# Patient Record
Sex: FEMALE | Race: WHITE | NOT HISPANIC OR LATINO | Employment: OTHER | URBAN - METROPOLITAN AREA
[De-identification: names, ages, dates, MRNs, and addresses within clinical notes are randomized per-mention and may not be internally consistent; named-entity substitution may affect disease eponyms.]

---

## 2017-04-18 ENCOUNTER — ALLSCRIPTS OFFICE VISIT (OUTPATIENT)
Dept: OTHER | Facility: OTHER | Age: 22
End: 2017-04-18

## 2018-01-11 NOTE — PSYCH
Psych Med Mgmt    Appearance: was calm and cooperative  Observed mood: less anxious  Observed mood: affect appropriate  Speech: a normal rate  Thought processes: normal thought processes  Hallucinations: no hallucinations present  Thought Content: no delusions  Abnormal Thoughts: The patient has no suicidal thoughts  Orientation: The patient is oriented to person, place and time, oriented to person, oriented to place and oriented to time  Recent and Remote Memory: short term memory intact and long term memory intact  Judgment: concentration fair  Insight and judgment improving   Muscle Strength And Tone  Muscle strength and tone were normal  Normal gait and station  Language: no difficulty naming common objects, no difficulty repeating a phrase and no difficulty writing a sentence  Fund of knowledge: Patient displays  above average  The patient is experiencing no localized pain  On a scale of 0 - 10 the pain severity is a 0  Goals addressed in session: Medication management  Supportive therapy     Treatment Recommendations: I met with Lissett Cook by myself  Genesis Harkins stated she graduated from [employer]  Also her boyfriend graduated and is looking for a job  Temporarily they're staying with her parents and is very stressful for her  She also talked about her plans for the future, she wants to take some classes on line and 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 with disability the way she has been helped  She finds that Cymbalta 30 mg has been helpful and would like to stay at that dose  She also finds that Lamictal 100 mg one tablet twice a day helps her and she tries to take the least amount of Alprazolam that she can  For now she finds that her medications are working well, she knows that she has stressful moments ahead and is trying to prepare   for them    She agrees with plan of care and will follow-up in 90 days or less if needed  The patient has been filling controlled prescriptions on time as prescribed to [facility] program     She reports decreased appetite, normal energy level, recent one lbs weight loss and normal number of sleep hours  Vitals  Signs   Recorded: 16RWC4934 01:01AM   Heart Rate: 80  Systolic: 531  Diastolic: 70  Height: 5 ft 5 in  Weight: 127 lb   BMI Calculated: 21 13  BSA Calculated: 1 63    Assessment    1  ADHD (attention deficit hyperactivity disorder), combined type (314 01) (F90 2)   2  Anxiety disorder (300 00) (F41 9)   3  Asperger's disorder (299 80) (F84 5)   4  Panic disorder (300 01) (F41 0)    Plan    1  DULoxetine HCl - 30 MG Oral Capsule Delayed Release Particles (Cymbalta); Take one capsule daily   2  ALPRAZolam 0 25 MG Oral Tablet; May take up to three tablets daily if needed for   anxiety    3  LamoTRIgine 100 MG Oral Tablet; TAKE 1 TABLET TWICE DAILY    Review of Systems    Constitutional: recent one lb weight loss, but No fever, no chills, feels well, no tiredness, no recent weight gain or loss  Cardiovascular: no complaints of slow or fast heart rate, no chest pain, no palpitations  Respiratory: no complaints of shortness of breath, no wheezing, no dyspnea on exertion  Gastrointestinal: no complaints of abdominal pain, no constipation, no nausea, no diarrhea, no vomiting  Genitourinary: no complaints of dysuria, no incontinence, no pelvic pain, no urinary frequency  Musculoskeletal: no complaints of arthralgia, no myalgias, no limb pain, no joint stiffness  Integumentary: no complaints of skin rash, no itching, no dry skin  Neurological: no complaints of headache, no confusion, no numbness, no dizziness  Active Problems    1  ADHD (attention deficit hyperactivity disorder), combined type (314 01) (F90 2)   2  Anxiety disorder (300 00) (F41 9)   3  Asperger's disorder (299 80) (F84 5)    Past Medical History    1   History of Oppositional defiant disorder (313 81) (F91 3)    The active problems and past medical history were reviewed and updated today  Allergies    1  Childrens Ibuprofen SUSP    Current Meds   1  ALPRAZolam 0 25 MG Oral Tablet; May take up to three tablets daily if needed for anxiety; Therapy: 02WPM8043 to (Evaluate:20Jan2017)  Requested for: 94Vyp0341; Last   Rx:52Qet9397 Ordered   2  Duac CS 1-5 % KIT; KIT EX X 20; Therapy: 05FCD2862 to (Last Rx:25Mar2011)  Requested for: 40IWJ1955 Ordered   3  Fluticasone Propionate 50 MCG/ACT Nasal Suspension; Therapy: 19CEV5048 to Recorded   4  LamoTRIgine 100 MG Oral Tablet; TAKE 1 TABLET TWICE DAILY; Therapy: 22YLN7617 to (Evaluate:20Jan2017)  Requested for: 69Nda9762; Last   Rx:39Pzv1508 Ordered   5  Norgestimate-Eth Estradiol 0 25-35 MG-MCG Oral Tablet; Therapy: 49GUB2242 to Recorded   6  Ventolin  (90 Base) MCG/ACT Inhalation Aerosol Solution; Therapy: 68HQT9348 to Recorded    The medication list was reviewed and updated today  Family Psych History    The family history was reviewed and updated today  Social History    · College student   · Household: Younger brother   · Never a smoker  The social history was reviewed and updated today  The social history was reviewed and is unchanged  She graduated from [employer]  End of Encounter Meds    1  ALPRAZolam 0 25 MG Oral Tablet; May take up to three tablets daily if needed for anxiety; Therapy: 81ZMQ8759 to (Evaluate:19May2017)  Requested for: 66Ucv8801; Last   Rx:20Dec2016 Ordered   2  DULoxetine HCl - 30 MG Oral Capsule Delayed Release Particles (Cymbalta); Take one   capsule daily; Therapy: 95Jsa8770 to (Evaluate:19May2017)  Requested for: 59Piv5390; Last   Rx:06Rpu0098 Ordered    3  LamoTRIgine 100 MG Oral Tablet; TAKE 1 TABLET TWICE DAILY; Therapy: 60NKU0009 to (Evaluate:19May2017)  Requested for: 71Uwn2459; Last   Rx:14Lwv3057 Ordered    4   Duac CS 1-5 % KIT; KIT EX X 20; Therapy: 59XNP6845 to (Last Rx:25Mar2011)  Requested for: 40YBA1630 Ordered   5  Fluticasone Propionate 50 MCG/ACT Nasal Suspension; Therapy: 24OOL0316 to Recorded   6  Norgestimate-Eth Estradiol 0 25-35 MG-MCG Oral Tablet; Therapy: 64TEG7619 to Recorded   7  Ventolin  (90 Base) MCG/ACT Inhalation Aerosol Solution;    Therapy: 89VQX4769 to Recorded    Future Appointments    Date/Time Provider Specialty Site   04/18/2017 10:00 AM Mendoza Mcdonald MD Psychiatry [facility] 81     Signatures   Electronically signed by : Donald Webber MD; Jan 5 2017  1:04AM EST                       (Author)

## 2018-01-13 NOTE — PSYCH
Psych Med Mgmt    Appearance: was calm and cooperative  Observed mood: anxious  Observed mood: affect appropriate  Speech: a normal rate  Thought processes: normal thought processes  Hallucinations: no hallucinations present  Thought Content: no delusions  Abnormal Thoughts: The patient has no suicidal thoughts  Orientation: The patient is oriented to person, place and time, oriented to person, oriented to place and oriented to time  Recent and Remote Memory: short term memory intact and long term memory intact  Judgment: Concentration good insight and judgment improving   Muscle Strength And Tone  Muscle strength and tone were normal  Normal gait and station  Language: no difficulty naming common objects, no difficulty repeating a phrase and no difficulty writing a sentence  Fund of knowledge: Patient displays  Above-average  The patient is experiencing no localized pain  On a scale of 0 - 10 the pain severity is a 0  Goals addressed in session: Medication management  Supportive therapy     Treatment Recommendations: I met with Mark Mills by myself  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, 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[employer], 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  Markекатерина Mills stated she continues to struggle with her inability to leave the house especially going grocery shopping and other things that   she needs to get out of the house for  She has looked into service dog and brought papers for me to fill out in order to receive   grants that could pay for it the service and training of the dog, to assist someone with autism spectrum disorder  She struggles with getting out of the house  She struggles interacting with people she doesn't know, she struggles with loud noises others touching her, bright lights  She struggles misreading the cues of the environment and not being sure of interactions with others    She wants to continue to help others and she realizes in order to do that she's got to overcome some of her limitations  As long as she is in her home, her anxiety is manageable and she can do a lot from home but she knows is not enough  In her relationship with her boyfriend because he is not driving right now and he depends on her, she pushes herself not to let him   down  Willy Mike tends to isolate to decrease her feelings of anxiety and panic attacks  She denied feeling depressed, depression comes when she sees her limitations and she faces those when she tries to get out of   the house  For now she thinks that her medications are helpful and will continue the same  Continue with Cymbalta 30 mg daily  Lamictal 100 mg twice a day and alprazolam 0 25 once a day if needed for anxiety  Willy Mike realizes that in spite of his limitation she also has made a lot of progress  Will continue with present treatment plan  Willy Mike agreed to plan of care  We'll follow-up in 60-90 days or less if needed  She reports normal appetite, normal energy level, no weight change and normal number of sleep hours  Vitals  Signs   Recorded: 18Apr2017 10:23AM   Heart Rate: 80  Systolic: 105  Diastolic: 90  Height: 5 ft 5 in  Weight: 128 lb   BMI Calculated: 21 3  BSA Calculated: 1 64    Assessment    1  ADHD (attention deficit hyperactivity disorder), combined type (314 01) (F90 2)   2  Anxiety disorder (300 00) (F41 9)   3  Asperger's disorder (299 80) (F84 5)   4  Panic disorder (300 01) (F41 0)    Plan    1  From  ALPRAZolam 0 25 MG Oral Tablet May take up to two tablets per day for   severe anxiety To ALPRAZolam 0 25 MG Oral Tablet Take one tablet if needed daily for   anxiety   2  DULoxetine HCl - 30 MG Oral Capsule Delayed Release Particles (Cymbalta); Take one capsule daily    3  LamoTRIgine 100 MG Oral Tablet; TAKE 1 TABLET TWICE DAILY    Review of Systems    Constitutional: No fever, no chills, feels well, no tiredness, no recent weight gain or loss     Cardiovascular: no complaints of slow or fast heart rate, no chest pain, no palpitations  Respiratory: no complaints of shortness of breath, no wheezing, no dyspnea on exertion  Gastrointestinal: no complaints of abdominal pain, no constipation, no nausea, no diarrhea, no vomiting  Genitourinary: no complaints of dysuria, no incontinence, no pelvic pain, no urinary frequency  Musculoskeletal: no complaints of arthralgia, no myalgias, no limb pain, no joint stiffness  Integumentary: no complaints of skin rash, no itching, no dry skin  Neurological: no complaints of headache, no confusion, no numbness, no dizziness  Active Problems    1  ADHD (attention deficit hyperactivity disorder), combined type (314 01) (F90 2)   2  Anxiety disorder (300 00) (F41 9)   3  Asperger's disorder (299 80) (F84 5)   4  Panic disorder (300 01) (F41 0)    Past Medical History    1  History of Oppositional defiant disorder (313 81) (F91 3)    Allergies    1  Childrens Ibuprofen SUSP    Current Meds   1  ALPRAZolam 0 25 MG Oral Tablet; May take up to two tablets per day for severe anxiety; Therapy: 60LHD9708 to (Evaluate:07Apr2017)  Requested for: 67LMI7655; Last   Rx:08Mar2017 Ordered   2  Duac CS 1-5 % KIT; KIT EX X 20; Therapy: 23QVR8539 to (Last Rx:25Mar2011)  Requested for: 76BCP1052 Ordered   3  DULoxetine HCl - 30 MG Oral Capsule Delayed Release Particles; Take one capsule   daily; Therapy: 33Lda0358 to (Evaluate:19May2017)  Requested for: 45Ysm9131; Last   Rx:03Uke8653 Ordered   4  Fluticasone Propionate 50 MCG/ACT Nasal Suspension; Therapy: 15TJZ4794 to Recorded   5  LamoTRIgine 100 MG Oral Tablet; TAKE 1 TABLET TWICE DAILY; Therapy: 74NLH2287 to (Evaluate:72Icx7498)  Requested for: 07Hvv8203; Last   Rx:72Lnu0578 Ordered   6  Norgestimate-Eth Estradiol 0 25-35 MG-MCG Oral Tablet; Therapy: 20EGT7792 to Recorded   7  Ventolin  (90 Base) MCG/ACT Inhalation Aerosol Solution;    Therapy: 68GZL9798 to Recorded    The medication list was reviewed and updated today  Family Psych History    The family history was reviewed and updated today  Social History    · College student   · Household: Younger brother   · Never a smoker  The social history was reviewed and updated today  The social history was reviewed and is unchanged  She graduated from [employer]  She is now working for the [employer]  End of Encounter Meds    1  ALPRAZolam 0 25 MG Oral Tablet; Take one tablet if needed daily for anxiety; Therapy: 25XUJ3224 to (Evaluate:99Vgi3180)  Requested for: 24Pgy3214; Last   Rx:18Apr2017 Ordered   2  DULoxetine HCl - 30 MG Oral Capsule Delayed Release Particles (Cymbalta); Take one   capsule daily; Therapy: 62Qaz2703 to (Evaluate:48Xzs9966)  Requested for: 41Fus8429; Last   Rx:18Apr2017 Ordered    3  LamoTRIgine 100 MG Oral Tablet; TAKE 1 TABLET TWICE DAILY; Therapy: 13RRR5143 to (Evaluate:33Wti9378)  Requested for: 18Apr2017; Last   Rx:18Apr2017 Ordered    4  Duac CS 1-5 % KIT; KIT EX X 20; Therapy: 44LUV9946 to (Last Rx:25Mar2011)  Requested for: 52VUC4108 Ordered   5  Fluticasone Propionate 50 MCG/ACT Nasal Suspension; Therapy: 19OEB6605 to Recorded   6  Norgestimate-Eth Estradiol 0 25-35 MG-MCG Oral Tablet; Therapy: 65ISZ8891 to Recorded   7  Ventolin  (90 Base) MCG/ACT Inhalation Aerosol Solution;    Therapy: 43YDC9806 to Recorded    Future Appointments    Date/Time Provider Specialty Site   09/06/2017 08:30 AM Josué Boston MD Psychiatry [facility] 81     Signatures   Electronically signed by : Ester Diaz MD; Apr 22 2017  9:12PM EST                       (Author)

## 2018-01-16 NOTE — PSYCH
Psych Med Mgmt    Appearance: was calm and cooperative  Observed mood: anxious  Observed mood: affect appropriate  Speech: a normal rate  Thought processes: normal thought processes  Hallucinations: no hallucinations present  Thought Content: no delusions  Abnormal Thoughts: The patient has no suicidal thoughts  Orientation: The patient is oriented to person, place and time, oriented to person, oriented to place and oriented to time  Recent and Remote Memory: short term memory intact  Attention Span And Concentration: concentration intact  Judgment: insight and judgment fair   Muscle Strength And Tone  Muscle strength and tone were normal  Normal gait and station  Language: no difficulty naming common objects, no difficulty repeating a phrase and no difficulty writing a sentence  Fund of knowledge: Patient displays  at grade level  The patient is experiencing no localized pain  On a scale of 0 - 10 the pain severity is a 0  Treatment Recommendations: I met with Melinda by myself  She stated the last semester was very difficult because her anxiety was completely out of control  She missed a lot of classes, and some of her professors were not as helpful worsening her anxiety  She discussed how she has had to deal with relationship with parents, relationship with partner and his family  Last time we tried Zoloft, she felt like she was in a fog  Her father had tried Zoloft and had the same experience she did  He tried later Paxil and it was more helpful to him  She would like to try Paxil  We discussed benefits and risks of Paxil  Will start with 10 mg 1/2 tablet in the morning and 1/2 tablet in the evening  PT agreed to plan of care  Will follow up in four weeks or before if needed  Vitals  Signs [Data Includes: Current Encounter]   Recorded: [id number] 12:15PM   Height: 5 ft 5 in  Weight: 148 lb   BMI Calculated: 24 63  BSA Calculated: 1 74    Assessment    1  Asperger's disorder (299 80) (F84 5)   2  Anxiety disorder (300 00) (F41 9)   3  ADHD (attention deficit hyperactivity disorder), combined type (314 01) (F90 2)    Plan    1  Strattera 60 MG Oral Capsule   2  (1) CBC/PLT/DIFF; Status:Active; Requested SGL:83YWS8630;    3  (1) COMPREHENSIVE METABOLIC PANEL; Status:Active; Requested SON:53NZP7758;    4  (1) T4, FREE; Status:Active; Requested WAZ:23HUA6114;    5  (1) TSH; Status:Active; Requested EJ38OZM8843;    6  (1) VITAMIN D 25-HYDROXY; Status:Active; Requested RHO:54TMT2696;     7  PARoxetine HCl - 10 MG Oral Tablet (Paxil); Take 1/2 table for seven days then   1/2 bid    Review of Systems    Constitutional: No fever, no chills, feels well, no tiredness, no recent weight gain or loss  Cardiovascular: no complaints of slow or fast heart rate, no chest pain, no palpitations  Respiratory: no complaints of shortness of breath, no wheezing, no dyspnea on exertion  Gastrointestinal: nausea and vomiting  Genitourinary: no complaints of dysuria, no incontinence, no pelvic pain, no urinary frequency  Musculoskeletal: no complaints of arthralgia, no myalgias, no limb pain, no joint stiffness  Integumentary: no complaints of skin rash, no itching, no dry skin  Neurological: no complaints of headache, no confusion, no numbness, no dizziness  Active Problems    1  ADHD (attention deficit hyperactivity disorder), combined type (314 01) (F90 2)   2  Anxiety disorder (300 00) (F41 9)   3  Asperger's disorder (299 80) (F84 5)    Past Medical History    1  History of Oppositional defiant disorder (313 81) (F91 3)    The active problems and past medical history were reviewed and updated today  Allergies    1  Childrens Ibuprofen SUSP    Current Meds   1  ALPRAZolam 0 25 MG Oral Tablet; May take up to three tablets daily if needed for anxiety; Therapy: 20NRA8392 to (Evaluate:2016)  Requested for: 39Myz9864; Last   Rx:09Ieb8790 Ordered   2   Duac CS 1-5 % KIT; KIT EX X 20; Therapy: 46JNM6378 to (Last Rx:25Mar2011)  Requested for: 69PRD4847 Ordered   3  Fluticasone Propionate 50 MCG/ACT Nasal Suspension; Therapy: 52ZQQ7493 to Recorded   4  LamoTRIgine 100 MG Oral Tablet; TAKE 1 TABLET TWICE DAILY; Therapy: 24ZAL9209 to (Evaluate:18Jul2016)  Requested for: 00Zcx9384; Last   Rx:73Pbd8727 Ordered   5  Norgestimate-Eth Estradiol 0 25-35 MG-MCG Oral Tablet; Therapy: 69EYI5982 to Recorded   6  Strattera 60 MG Oral Capsule; TAKE 1 CAPSULE DAILY; Therapy: 71KXK2354 to (Evaluate:22Oiu3079)  Requested for: 24Gzy2605; Last   Rx:13Vhx6038 Ordered   7  Ventolin  (90 Base) MCG/ACT Inhalation Aerosol Solution; Therapy: 16KTD9527 to Recorded    The medication list was reviewed and updated today  Family Psych History    The family history was reviewed and updated today  Social History    · College student   · Household: Younger brother   · Never a smoker  The social history was reviewed and updated today  The social history was reviewed and is unchanged  She is attending [employer]  End of Encounter Meds    1  ALPRAZolam 0 25 MG Oral Tablet; May take up to three tablets daily if needed for anxiety; Therapy: 43KFY4834 to (Evaluate:18Jun2016)  Requested for: 91Bel4253; Last   Rx:01Ciu8652 Ordered   2  PARoxetine HCl - 10 MG Oral Tablet (Paxil); Take 1/2 table for seven days then 1/2 bid; Therapy: 82KHP0158 to (Evaluate:54Zmx7936)  Requested for: 72TLE9027; Last   Rx:08Jun2016 Ordered    3  LamoTRIgine 100 MG Oral Tablet; TAKE 1 TABLET TWICE DAILY; Therapy: 14OXS5538 to (Evaluate:33Dwy5537)  Requested for: 84Umj7925; Last   Rx:70Yhs3223 Ordered    4  Duac CS 1-5 % KIT; KIT EX X 20; Therapy: 83EPP4450 to (Last Rx:25Mar2011)  Requested for: 03KTP6487 Ordered   5  Fluticasone Propionate 50 MCG/ACT Nasal Suspension; Therapy: 36NCG5980 to Recorded   6  Norgestimate-Eth Estradiol 0 25-35 MG-MCG Oral Tablet;    Therapy: 07ZWJ8080 to Recorded   7  Ventolin  (90 Base) MCG/ACT Inhalation Aerosol Solution;    Therapy: 51TCF9804 to Recorded    Future Appointments    Date/Time Provider Specialty Site   08/23/2016 03:00 PM Gaurang David MD Psychiatry [facility] 81     Signatures   Electronically signed by : Dominique Loyd MD; Jun 11 2016 10:32PM EST                       (Author)

## 2018-01-17 NOTE — MISCELLANEOUS
Message  I spoke with Jaquelin Galvez, she has had a rash for over a month  First she thought it was poison ivy, then sometimes she gets hives from anxiety, but  her rash in her stomach is getting worse, and she thought it was the Paxil  She stopped it, it she believes it may be better  She is concerned with the amount of anxiety she has, she is taking a class, and she is also   teaching a class, and she has to get through  For now we discussed to stop Paxil  Will try Vistaril 10 mg twice per day if needed for anxiety  It could help decrease anxiety and also  help with rash  PT will call me Monday, and we will decide if we need to consider another medication  PT agreed with plan of care        Plan  Anxiety disorder    · HydrOXYzine HCl - 10 MG Oral Tablet; take one to two daily prn for anxiety    Signatures   Electronically signed by : Jb Goodman MD; Jul 8 2016  1:43PM EST                       (Author)

## 2018-01-22 VITALS
BODY MASS INDEX: 21.33 KG/M2 | HEART RATE: 80 BPM | SYSTOLIC BLOOD PRESSURE: 121 MMHG | DIASTOLIC BLOOD PRESSURE: 90 MMHG | HEIGHT: 65 IN | WEIGHT: 128 LBS

## 2019-06-27 ENCOUNTER — HOSPITAL ENCOUNTER (EMERGENCY)
Facility: HOSPITAL | Age: 24
Discharge: HOME | End: 2019-06-27
Attending: EMERGENCY MEDICINE
Payer: COMMERCIAL

## 2019-06-27 ENCOUNTER — APPOINTMENT (EMERGENCY)
Dept: CT IMAGING | Facility: HOSPITAL | Age: 24
End: 2019-06-27
Payer: COMMERCIAL

## 2019-06-27 VITALS
HEART RATE: 98 BPM | TEMPERATURE: 97.6 F | SYSTOLIC BLOOD PRESSURE: 110 MMHG | HEIGHT: 65 IN | RESPIRATION RATE: 16 BRPM | OXYGEN SATURATION: 98 % | DIASTOLIC BLOOD PRESSURE: 58 MMHG | WEIGHT: 150 LBS | BODY MASS INDEX: 24.99 KG/M2

## 2019-06-27 DIAGNOSIS — R10.84 GENERALIZED ABDOMINAL PAIN: ICD-10-CM

## 2019-06-27 DIAGNOSIS — R11.0 NAUSEA: Primary | ICD-10-CM

## 2019-06-27 LAB
ALBUMIN SERPL BCP-MCNC: 4.2 G/DL (ref 3.5–5.7)
ALP SERPL-CCNC: 86 U/L (ref 40–150)
ALT SERPL W P-5'-P-CCNC: 12 U/L (ref 7–52)
ANION GAP SERPL CALCULATED.3IONS-SCNC: 9 MMOL/L (ref 4–13)
AST SERPL W P-5'-P-CCNC: 14 U/L (ref 13–39)
BACTERIA UR QL AUTO: ABNORMAL /HPF
BASOPHILS # BLD AUTO: 0.1 THOUSANDS/ΜL (ref 0–0.1)
BASOPHILS NFR BLD AUTO: 1 % (ref 0–1)
BILIRUB SERPL-MCNC: 0.3 MG/DL (ref 0.2–1)
BILIRUB UR QL STRIP: ABNORMAL
BUN SERPL-MCNC: 12 MG/DL (ref 7–25)
CALCIUM SERPL-MCNC: 9.9 MG/DL (ref 8.6–10.5)
CHLORIDE SERPL-SCNC: 102 MMOL/L (ref 98–107)
CLARITY UR: ABNORMAL
CO2 SERPL-SCNC: 25 MMOL/L (ref 21–31)
COLOR UR: YELLOW
CREAT SERPL-MCNC: 0.76 MG/DL (ref 0.6–1.2)
EOSINOPHIL # BLD AUTO: 0.1 THOUSAND/ΜL (ref 0–0.61)
EOSINOPHIL NFR BLD AUTO: 2 % (ref 0–6)
ERYTHROCYTE [DISTWIDTH] IN BLOOD BY AUTOMATED COUNT: 12.3 % (ref 11.6–15.1)
EXT PREG TEST URINE: NEGATIVE
EXT. CONTROL ED NAV: NORMAL
GFR SERPL CREATININE-BSD FRML MDRD: 111 ML/MIN/1.73SQ M
GLUCOSE SERPL-MCNC: 106 MG/DL (ref 65–140)
GLUCOSE UR STRIP-MCNC: NEGATIVE MG/DL
HCT VFR BLD AUTO: 45.1 % (ref 37–47)
HGB BLD-MCNC: 15.3 G/DL (ref 11.5–15.4)
HGB UR QL STRIP.AUTO: NEGATIVE
KETONES UR STRIP-MCNC: ABNORMAL MG/DL
LEUKOCYTE ESTERASE UR QL STRIP: NEGATIVE
LIPASE SERPL-CCNC: 11 U/L (ref 11–82)
LYMPHOCYTES # BLD AUTO: 1.8 THOUSANDS/ΜL (ref 0.6–4.47)
LYMPHOCYTES NFR BLD AUTO: 21 % (ref 14–44)
MCH RBC QN AUTO: 28.7 PG (ref 26.8–34.3)
MCHC RBC AUTO-ENTMCNC: 33.9 G/DL (ref 31.4–37.4)
MCV RBC AUTO: 85 FL (ref 82–98)
MONOCYTES # BLD AUTO: 0.7 THOUSAND/ΜL (ref 0.17–1.22)
MONOCYTES NFR BLD AUTO: 8 % (ref 4–12)
MUCOUS THREADS UR QL AUTO: ABNORMAL
NEUTROPHILS # BLD AUTO: 5.9 THOUSANDS/ΜL (ref 1.85–7.62)
NEUTS SEG NFR BLD AUTO: 69 % (ref 43–75)
NITRITE UR QL STRIP: NEGATIVE
NON-SQ EPI CELLS URNS QL MICRO: ABNORMAL /HPF
PH UR STRIP.AUTO: 6.5 [PH]
PLATELET # BLD AUTO: 318 THOUSANDS/UL (ref 149–390)
PMV BLD AUTO: 8.5 FL (ref 8.9–12.7)
POTASSIUM SERPL-SCNC: 4 MMOL/L (ref 3.5–5.5)
PROT SERPL-MCNC: 7.9 G/DL (ref 6.4–8.9)
PROT UR STRIP-MCNC: ABNORMAL MG/DL
RBC # BLD AUTO: 5.33 MILLION/UL (ref 3.81–5.12)
RBC #/AREA URNS AUTO: ABNORMAL /HPF
SODIUM SERPL-SCNC: 136 MMOL/L (ref 134–143)
SP GR UR STRIP.AUTO: >=1.03 (ref 1–1.03)
UROBILINOGEN UR QL STRIP.AUTO: 0.2 E.U./DL
WBC # BLD AUTO: 8.6 THOUSAND/UL (ref 4.31–10.16)
WBC #/AREA URNS AUTO: ABNORMAL /HPF

## 2019-06-27 PROCEDURE — 81001 URINALYSIS AUTO W/SCOPE: CPT | Performed by: EMERGENCY MEDICINE

## 2019-06-27 PROCEDURE — 81025 URINE PREGNANCY TEST: CPT | Performed by: EMERGENCY MEDICINE

## 2019-06-27 PROCEDURE — 80053 COMPREHEN METABOLIC PANEL: CPT | Performed by: EMERGENCY MEDICINE

## 2019-06-27 PROCEDURE — 85025 COMPLETE CBC W/AUTO DIFF WBC: CPT | Performed by: EMERGENCY MEDICINE

## 2019-06-27 PROCEDURE — 74176 CT ABD & PELVIS W/O CONTRAST: CPT

## 2019-06-27 PROCEDURE — 96361 HYDRATE IV INFUSION ADD-ON: CPT

## 2019-06-27 PROCEDURE — 99284 EMERGENCY DEPT VISIT MOD MDM: CPT

## 2019-06-27 PROCEDURE — 96374 THER/PROPH/DIAG INJ IV PUSH: CPT

## 2019-06-27 PROCEDURE — 36415 COLL VENOUS BLD VENIPUNCTURE: CPT | Performed by: EMERGENCY MEDICINE

## 2019-06-27 PROCEDURE — 96376 TX/PRO/DX INJ SAME DRUG ADON: CPT

## 2019-06-27 PROCEDURE — 83690 ASSAY OF LIPASE: CPT | Performed by: EMERGENCY MEDICINE

## 2019-06-27 PROCEDURE — 96375 TX/PRO/DX INJ NEW DRUG ADDON: CPT

## 2019-06-27 RX ORDER — ONDANSETRON 2 MG/ML
4 INJECTION INTRAMUSCULAR; INTRAVENOUS ONCE
Status: COMPLETED | OUTPATIENT
Start: 2019-06-27 | End: 2019-06-27

## 2019-06-27 RX ORDER — ALPRAZOLAM 0.25 MG/1
TABLET ORAL
COMMUNITY
Start: 2013-12-17

## 2019-06-27 RX ORDER — NORGESTIMATE AND ETHINYL ESTRADIOL 0.25-0.035
KIT ORAL
COMMUNITY
Start: 2014-01-02

## 2019-06-27 RX ORDER — VENLAFAXINE 100 MG/1
150 TABLET ORAL DAILY
COMMUNITY

## 2019-06-27 RX ORDER — ESCITALOPRAM OXALATE 10 MG/1
20 TABLET ORAL DAILY
COMMUNITY

## 2019-06-27 RX ADMIN — SODIUM CHLORIDE 1000 ML: 0.9 INJECTION, SOLUTION INTRAVENOUS at 06:37

## 2019-06-27 RX ADMIN — ONDANSETRON 4 MG: 2 INJECTION INTRAMUSCULAR; INTRAVENOUS at 06:37

## 2019-06-27 RX ADMIN — MORPHINE SULFATE 2 MG: 2 INJECTION, SOLUTION INTRAMUSCULAR; INTRAVENOUS at 06:41

## 2019-06-27 RX ADMIN — ONDANSETRON 4 MG: 2 INJECTION INTRAMUSCULAR; INTRAVENOUS at 07:59

## 2019-06-27 NOTE — ED PROVIDER NOTES
History  Chief Complaint   Patient presents with    Abdominal Pain     with nausea, vomitting, headache  intermittent for several days, constant the past 24 hours  Patient is autistic  Patient 20-year-old female who reports that she has had 2 days of nausea vomiting and diarrhea  Patient also reports crampy abdominal pain  She denies any dysuria or frequency  Patient states she has had a fever  Patient denies any recent travel          None       History reviewed  No pertinent past medical history  History reviewed  No pertinent surgical history  History reviewed  No pertinent family history  I have reviewed and agree with the history as documented  Social History     Tobacco Use    Smoking status: Never Smoker    Smokeless tobacco: Never Used   Substance Use Topics    Alcohol use: Never     Frequency: Never    Drug use: Never        Review of Systems   Constitutional: Positive for fever  HENT: Negative  Respiratory: Negative  Cardiovascular: Negative  Gastrointestinal: Positive for abdominal pain, diarrhea, nausea and vomiting  Genitourinary: Negative  Musculoskeletal: Positive for myalgias  Skin: Negative  Neurological: Negative  Physical Exam  Physical Exam   Constitutional: She is oriented to person, place, and time  She appears well-developed and well-nourished  HENT:   Head: Normocephalic and atraumatic  Eyes: EOM are normal  No scleral icterus  Cardiovascular: Normal rate and regular rhythm  Pulmonary/Chest: Effort normal and breath sounds normal    Abdominal: Soft  There is tenderness  There is no rebound and no guarding  Mild diffuse tenderness  No guarding or rebound   Neurological: She is alert and oriented to person, place, and time  Skin: Skin is warm and dry  Capillary refill takes less than 2 seconds  Psychiatric: She has a normal mood and affect  Her behavior is normal    Nursing note and vitals reviewed        Vital Signs  ED Triage Vitals   Temperature Pulse Respirations Blood Pressure SpO2   06/27/19 0612 06/27/19 0612 06/27/19 0612 06/27/19 0612 06/27/19 0612   97 6 °F (36 4 °C) 102 16 141/66 97 %      Temp Source Heart Rate Source Patient Position - Orthostatic VS BP Location FiO2 (%)   06/27/19 0612 06/27/19 0612 -- 06/27/19 0612 --   Tympanic Monitor  Left arm       Pain Score       06/27/19 0610       4           Vitals:    06/27/19 0612   BP: 141/66   Pulse: 102         Visual Acuity      ED Medications  Medications   sodium chloride 0 9 % bolus 1,000 mL (1,000 mL Intravenous New Bag 6/27/19 0637)   morphine injection 2 mg (2 mg Intravenous Given 6/27/19 0641)   ondansetron (ZOFRAN) injection 4 mg (4 mg Intravenous Given 6/27/19 2182)       Diagnostic Studies  Results Reviewed     Procedure Component Value Units Date/Time    UA w Reflex to Microscopic w Reflex to Culture [[id number]]  (Abnormal) Collected:  06/27/19 0643    Lab Status:  Final result Specimen:  Urine, Clean Catch Updated:  06/27/19 0652     Color, UA Yellow     Clarity, UA Slightly Cloudy     Specific Gravity, UA >=1 030     pH, UA 6 5     Leukocytes, UA Negative     Nitrite, UA Negative     Protein, UA Trace mg/dl      Glucose, UA Negative mg/dl      Ketones, UA Trace mg/dl      Urobilinogen, UA 0 2 E U /dl      Bilirubin, UA 1+     Blood, UA Negative    CBC and differential [[id number]]  (Abnormal) Collected:  06/27/19 0636    Lab Status:  Final result Specimen:  Blood from Arm, Left Updated:  06/27/19 0652     WBC 8 60 Thousand/uL      RBC 5 33 Million/uL      Hemoglobin 15 3 g/dL      Hematocrit 45 1 %      MCV 85 fL      MCH 28 7 pg      MCHC 33 9 g/dL      RDW 12 3 %      MPV 8 5 fL      Platelets 951 Thousands/uL      Neutrophils Relative 69 %      Lymphocytes Relative 21 %      Monocytes Relative 8 %      Eosinophils Relative 2 %      Basophils Relative 1 %      Neutrophils Absolute 5 90 Thousands/µL      Lymphocytes Absolute 1 80 Thousands/µL      Monocytes Absolute 0 70 Thousand/µL      Eosinophils Absolute 0 10 Thousand/µL      Basophils Absolute 0 10 Thousands/µL     Urine Microscopic [[id number]] Collected:  06/27/19 0643    Lab Status: In process Specimen:  Urine, Clean Catch Updated:  06/27/19 0650    Comprehensive metabolic panel [[id number]] Collected:  06/27/19 0636    Lab Status: In process Specimen:  Blood from Arm, Left Updated:  06/27/19 0647    Lipase [[id number]] Collected:  06/27/19 0636    Lab Status: In process Specimen:  Blood from Arm, Left Updated:  06/27/19 0647    POCT pregnancy, urine [[id number]]  (Normal) Resulted:  06/27/19 0640    Lab Status:  Final result Updated:  06/27/19 0640     EXT PREG TEST UR (Ref: Negative) Negative     Control Valid                 No orders to display              Procedures  Procedures       ED Course                               MDM    Disposition  Final diagnoses:   None     ED Disposition     None      Follow-up Information    None         Patient's Medications    No medications on file     No discharge procedures on file      ED Provider  Electronically Signed by           Daniel Mace MD  06/29/19 4467

## 2019-06-27 NOTE — ED CARE HANDOFF
Emergency Department Sign Out Note        Sign out and transfer of care from Dr Arnulfo Torres  See Separate Emergency Department note  The patient, Jose M Quiroz, was evaluated by the previous provider for abdominal pain   Workup Completed:  Labs/UA and CT abdomen and pelvis with contrast    ED Course / Workup Pending (followup): Patient was seen and examined by bedside denies any abdominal pain at this time does complain of some nausea will give her Zofran  Result of discussed with the patient recommending to continue with hydration at home and return to the ED symptoms are worse  Procedures  MDM    Disposition  Final diagnoses:   Nausea   Generalized abdominal pain     Time reflects when diagnosis was documented in both MDM as applicable and the Disposition within this note     Time User Action Codes Description Comment    6/27/2019  7:53 AM Windell Common Add [R11 0] Nausea     6/27/2019  7:53 AM Windell Common Add [R10 84] Generalized abdominal pain       ED Disposition     ED Disposition Condition Date/Time Comment    Discharge Stable u Jun 27, 2019  8:04 AM Jose M Quiroz discharge to home/self care  Follow-up Information     Follow up With Specialties Details Why Contact Info      In 2 days If symptoms worsen F/u with your PCP        Patient's Medications   Discharge Prescriptions    No medications on file     No discharge procedures on file         ED Provider  Electronically Signed by     Irene Gallegos MD  06/27/19 7386

## 2020-01-11 ENCOUNTER — HOSPITAL ENCOUNTER (EMERGENCY)
Facility: HOSPITAL | Age: 25
Discharge: HOME | End: 2020-01-11
Attending: EMERGENCY MEDICINE | Admitting: EMERGENCY MEDICINE
Payer: COMMERCIAL

## 2020-01-11 ENCOUNTER — APPOINTMENT (EMERGENCY)
Dept: ULTRASOUND IMAGING | Facility: HOSPITAL | Age: 25
End: 2020-01-11
Payer: COMMERCIAL

## 2020-01-11 VITALS
OXYGEN SATURATION: 99 % | SYSTOLIC BLOOD PRESSURE: 122 MMHG | HEART RATE: 87 BPM | TEMPERATURE: 98 F | RESPIRATION RATE: 18 BRPM | DIASTOLIC BLOOD PRESSURE: 68 MMHG

## 2020-01-11 DIAGNOSIS — R10.9 ABDOMINAL PAIN: Primary | ICD-10-CM

## 2020-01-11 LAB
ALBUMIN SERPL BCP-MCNC: 4.2 G/DL (ref 3.5–5.7)
ALP SERPL-CCNC: 81 U/L (ref 40–150)
ALT SERPL W P-5'-P-CCNC: 9 U/L (ref 7–52)
ANION GAP SERPL CALCULATED.3IONS-SCNC: 11 MMOL/L (ref 4–13)
AST SERPL W P-5'-P-CCNC: 10 U/L (ref 13–39)
BASOPHILS # BLD AUTO: 0.1 THOUSANDS/ΜL (ref 0–0.1)
BASOPHILS NFR BLD AUTO: 0 % (ref 0–2)
BILIRUB DIRECT SERPL-MCNC: 0.1 MG/DL (ref 0–0.2)
BILIRUB SERPL-MCNC: 0.3 MG/DL (ref 0.2–1)
BUN SERPL-MCNC: 14 MG/DL (ref 7–25)
CALCIUM SERPL-MCNC: 9.6 MG/DL (ref 8.6–10.5)
CHLORIDE SERPL-SCNC: 104 MMOL/L (ref 98–107)
CO2 SERPL-SCNC: 23 MMOL/L (ref 21–31)
CREAT SERPL-MCNC: 1.03 MG/DL (ref 0.6–1.2)
EOSINOPHIL # BLD AUTO: 0.2 THOUSAND/ΜL (ref 0–0.61)
EOSINOPHIL NFR BLD AUTO: 1 % (ref 0–5)
ERYTHROCYTE [DISTWIDTH] IN BLOOD BY AUTOMATED COUNT: 13 % (ref 11.5–14.5)
GFR SERPL CREATININE-BSD FRML MDRD: 76 ML/MIN/1.73SQ M
GLUCOSE SERPL-MCNC: 101 MG/DL (ref 65–99)
HCT VFR BLD AUTO: 43.5 % (ref 42–47)
HGB BLD-MCNC: 14.3 G/DL (ref 12–16)
LIPASE SERPL-CCNC: <10 U/L (ref 11–82)
LYMPHOCYTES # BLD AUTO: 1.6 THOUSANDS/ΜL (ref 0.6–4.47)
LYMPHOCYTES NFR BLD AUTO: 10 % (ref 21–51)
MCH RBC QN AUTO: 28.1 PG (ref 26–34)
MCHC RBC AUTO-ENTMCNC: 32.8 G/DL (ref 31–37)
MCV RBC AUTO: 86 FL (ref 81–99)
MONOCYTES # BLD AUTO: 1 THOUSAND/ΜL (ref 0.17–1.22)
MONOCYTES NFR BLD AUTO: 6 % (ref 2–12)
NEUTROPHILS # BLD AUTO: 12.5 THOUSANDS/ΜL (ref 1.4–6.5)
NEUTS SEG NFR BLD AUTO: 82 % (ref 42–75)
PLATELET # BLD AUTO: 325 THOUSANDS/UL (ref 149–390)
PMV BLD AUTO: 7.9 FL (ref 8.6–11.7)
POTASSIUM SERPL-SCNC: 4 MMOL/L (ref 3.5–5.5)
PROT SERPL-MCNC: 7.9 G/DL (ref 6.4–8.9)
RBC # BLD AUTO: 5.08 MILLION/UL (ref 3.9–5.2)
SODIUM SERPL-SCNC: 138 MMOL/L (ref 134–143)
WBC # BLD AUTO: 15.3 THOUSAND/UL (ref 4.8–10.8)

## 2020-01-11 PROCEDURE — 85025 COMPLETE CBC W/AUTO DIFF WBC: CPT | Performed by: EMERGENCY MEDICINE

## 2020-01-11 PROCEDURE — 76856 US EXAM PELVIC COMPLETE: CPT

## 2020-01-11 PROCEDURE — 96361 HYDRATE IV INFUSION ADD-ON: CPT

## 2020-01-11 PROCEDURE — 36415 COLL VENOUS BLD VENIPUNCTURE: CPT | Performed by: EMERGENCY MEDICINE

## 2020-01-11 PROCEDURE — 83690 ASSAY OF LIPASE: CPT | Performed by: EMERGENCY MEDICINE

## 2020-01-11 PROCEDURE — 96374 THER/PROPH/DIAG INJ IV PUSH: CPT

## 2020-01-11 PROCEDURE — 99284 EMERGENCY DEPT VISIT MOD MDM: CPT | Performed by: EMERGENCY MEDICINE

## 2020-01-11 PROCEDURE — 82248 BILIRUBIN DIRECT: CPT | Performed by: EMERGENCY MEDICINE

## 2020-01-11 PROCEDURE — 96372 THER/PROPH/DIAG INJ SC/IM: CPT

## 2020-01-11 PROCEDURE — 99284 EMERGENCY DEPT VISIT MOD MDM: CPT

## 2020-01-11 PROCEDURE — 80053 COMPREHEN METABOLIC PANEL: CPT | Performed by: EMERGENCY MEDICINE

## 2020-01-11 PROCEDURE — 96375 TX/PRO/DX INJ NEW DRUG ADDON: CPT

## 2020-01-11 RX ORDER — ONDANSETRON 4 MG/1
4 TABLET, FILM COATED ORAL EVERY 8 HOURS PRN
COMMUNITY

## 2020-01-11 RX ORDER — DIPHENHYDRAMINE HYDROCHLORIDE 50 MG/ML
25 INJECTION INTRAMUSCULAR; INTRAVENOUS ONCE
Status: COMPLETED | OUTPATIENT
Start: 2020-01-11 | End: 2020-01-11

## 2020-01-11 RX ORDER — KETOROLAC TROMETHAMINE 30 MG/ML
30 INJECTION, SOLUTION INTRAMUSCULAR; INTRAVENOUS ONCE
Status: COMPLETED | OUTPATIENT
Start: 2020-01-11 | End: 2020-01-11

## 2020-01-11 RX ORDER — HALOPERIDOL 5 MG/ML
5 INJECTION INTRAMUSCULAR ONCE
Status: COMPLETED | OUTPATIENT
Start: 2020-01-11 | End: 2020-01-11

## 2020-01-11 RX ORDER — BUSPIRONE HYDROCHLORIDE 10 MG/1
10 TABLET ORAL DAILY
COMMUNITY

## 2020-01-11 RX ORDER — ONDANSETRON 2 MG/ML
4 INJECTION INTRAMUSCULAR; INTRAVENOUS ONCE
Status: COMPLETED | OUTPATIENT
Start: 2020-01-11 | End: 2020-01-11

## 2020-01-11 RX ADMIN — KETOROLAC TROMETHAMINE 30 MG: 30 INJECTION, SOLUTION INTRAMUSCULAR; INTRAVENOUS at 09:39

## 2020-01-11 RX ADMIN — ONDANSETRON 4 MG: 2 INJECTION INTRAMUSCULAR; INTRAVENOUS at 09:37

## 2020-01-11 RX ADMIN — SODIUM CHLORIDE 1000 ML: 0.9 INJECTION, SOLUTION INTRAVENOUS at 09:35

## 2020-01-11 RX ADMIN — DIPHENHYDRAMINE HYDROCHLORIDE 25 MG: 50 INJECTION INTRAMUSCULAR; INTRAVENOUS at 09:37

## 2020-01-11 RX ADMIN — HALOPERIDOL LACTATE 5 MG: 5 INJECTION, SOLUTION INTRAMUSCULAR at 09:42

## 2020-01-11 NOTE — DISCHARGE INSTRUCTIONS
1  The examination and treatment that you have received has been on an emergency basis and is not intended as an effort to provide complete medical care  It is impossible to recognize and treat all elements of an illness or injury in a single ER visit  2  Vinicius Jim for allowing us to provide emergent medical care to you or your family member  We consider it a privilege to have served you during your illness or injury  3  If you have received a PRESCRIPTION please fill it TODAY and follow the instructions carefully  4  Call your PRIMARY doctor´s office today or the next business day, and tell them you were seen at the ED and that we recommended you be prioritized for an early follow-up appointment in the next 48 hours  Please follow up with any specialists we may have discussed and provided you information on  Also, there may be some RESULTS we have found which are non-emergent but need to be followed up  When you see your primary doctor, have them call and obtain a full copy of the results from our medical records department  Please obtain a copy of the results immediately to follow up with your Primary MD  This is important for continuity of care and if not done, may lead to further issues in your medical care  5  If you do NOT have a primary care doctor, it is important that you [address] [place] [address]  Call your insurance company to get a list of eligible providers  6  RETURN to this or another ER immediately for new, worsening, or concerning symptoms  We are open 24 hours a day and you may return any time  We are happy to see you again to make sure everything is okay  7  PAIN/FEVER RELIEF FOR ADULTS: For MILD PAIN RELIEF, adults can take acetaminophen (Tylenol) 1000 mg every 6 hours (DO NOT TAKE ACETAMINOPHEN IF YOU ARE ALSO TAKING OTHER MEDICATIONS WHICH CONTAIN ACETAMINOPHEN)  For MODERATE PAIN RELIEF ALSO take ibuprofen (Advil, Motrin) 600 mg every 6 hours   Do this for up to five days  You can take the acetaminophen and ibuprofen simultaneously, they have added pain relieving effects when taken at the same time  Do not take acetaminophen or ibuprofen if you have a known allergy or adverse reaction to these medications or if your doctor has advised you not to take them  Do not take Ibuprofen if you are pregnant, or have a history of kidney disease or gastritis or gastrointestinal bleeding  8  PAIN/FEVER RELIEF FOR CHILDREN: For mild pain or fever, give acetaminophen (Tylenol) according to the package instructions for your child's age/weight  For moderate pain/fever in children OVER 6 months also give ibuprofen, according to the package instructions for your child's age/weight  Do this for up to five days  You can give the acetaminophen and ibuprofen simultaneously, they have added pain relieving effects when taken at the same time  Do not give acetaminophen or ibuprofen if your child has a known allergy or adverse reaction to these medications or if your doctor has advised you not to give them  Precise dose information for acetaminophen based on your child's weight is available online at:   BIT  DO/APAP  and Precise dose information for ibuprofen based on your child's weight is available online at:   BIT  DO/IBUPROFEN  When using these online guides, take care to [address] of the product you are using and match it to the table  9  Be aware that REPEAT READINGS are often done on X-rays, CT scans, ultrasounds and other medical images and that in a small percentage of cases abnormalities are detected on these second readings and you may be contacted if this occurs  10  If you have had a SPLINT applied: be aware that there is always a small chance of a fracture not showing or being missed on imaging  For this reason, do not use or bear weight on the affected limb for the next 5 days   If pain does not resolve completely, or if pain becomes worse or other symptoms develop, see your doctor or return to the ED   You may need repeat imaging or other care

## 2020-01-11 NOTE — ED PROVIDER NOTES
History  Chief Complaint   Patient presents with    Abdominal Pain     Lower abd pain onset 12 hrs ago  Pt reports chronic pain of similar nature since July  Recently seen and cleared for UTI     Patient complaining of midline suprapubic discomfort for 1 day  Patient has been getting the exact same symptoms for 6 months approximately every 2 weeks for several days at a time  She reports to me that she has had an MRI which she said did not reveal any cause for her symptoms  She has also had a CT approximately 6 months ago which did not show any cause for her symptoms  She has not had an ultrasound  She has had 2 loose stools no blood no melena  Very mild nausea no vomiting no dysuria hematuria frequency no chest pain or shortness of breath  Her OBGYN would like to exclude ovarian cyst is the cause for her symptoms  She is scheduled to have an ultrasound in 2 weeks  Has chronic dyspareunia which has not changed  No vaginal discharge  Has been tested for STDs and tells me she was negative  No new partners  Prior to Admission Medications   Prescriptions Last Dose Informant Patient Reported? Taking?    ALPRAZolam (XANAX) 0 25 mg tablet 1/10/2020 at Unknown time  Yes Yes   Sig: Take by mouth   busPIRone (BUSPAR) 10 mg tablet 1/10/2020 at Unknown time  Yes Yes   Sig: Take 10 mg by mouth daily   escitalopram (LEXAPRO) 10 mg tablet 1/10/2020 at Unknown time Self Yes Yes   Sig: Take 20 mg by mouth daily    norgestimate-ethinyl estradiol (ORTHO-CYCLEN) 0 25-35 MG-MCG per tablet 1/10/2020 at Unknown time  Yes Yes   Sig: Take by mouth   ondansetron (ZOFRAN) 4 mg tablet 1/10/2020 at Unknown time Self Yes Yes   Sig: Take 4 mg by mouth every 8 (eight) hours as needed for nausea or vomiting   venlafaxine (EFFEXOR) 100 MG tablet 1/10/2020 at Unknown time  Yes Yes   Sig: Take 150 mg by mouth daily 150mg po HS  37mg po in AM      Facility-Administered Medications: None       Past Medical History:   Diagnosis Date  Autism        History reviewed  No pertinent surgical history  History reviewed  No pertinent family history  I have reviewed and agree with the history as documented  Social History     Tobacco Use    Smoking status: Never Smoker    Smokeless tobacco: Never Used   Substance Use Topics    Alcohol use: Never     Frequency: Never    Drug use: Never        Review of Systems   Constitutional: Negative for fever  HENT: Negative for rhinorrhea  Eyes: Negative for visual disturbance  Respiratory: Negative for shortness of breath  Cardiovascular: Negative for chest pain  Gastrointestinal: Positive for abdominal pain and nausea  Negative for diarrhea and vomiting  Endocrine: Negative for polydipsia  Genitourinary: Negative for dysuria, frequency and hematuria  Musculoskeletal: Negative for neck stiffness  Skin: Negative for rash  Allergic/Immunologic: Negative for immunocompromised state  Neurological: Negative for speech difficulty, weakness and numbness  Physical Exam  Physical Exam   Constitutional: She is oriented to person, place, and time  She appears well-nourished  No distress  HENT:   Head: Normocephalic and atraumatic  Eyes: Conjunctivae and EOM are normal    Neck: Normal range of motion  Neck supple  Cardiovascular: Regular rhythm and normal heart sounds  Pulmonary/Chest: Effort normal and breath sounds normal    Abdominal: Soft  Bowel sounds are normal  She exhibits no mass  There is tenderness in the suprapubic area  There is no rebound and no guarding  Musculoskeletal: She exhibits no edema  Neurological: She is alert and oriented to person, place, and time  Skin: Skin is warm and dry  Psychiatric: She has a normal mood and affect         Vital Signs  ED Triage Vitals   Temperature Pulse Respirations Blood Pressure SpO2   01/11/20 0840 01/11/20 0840 01/11/20 1030 01/11/20 0840 01/11/20 0840   98 °F (36 7 °C) 90 18 155/79 100 %      Temp Source Heart Rate Source Patient Position - Orthostatic VS BP Location FiO2 (%)   01/11/20 0840 01/11/20 0840 01/11/20 0840 01/11/20 0840 --   Temporal Monitor Lying Left arm       Pain Score       01/11/20 0939       7           Vitals:    01/11/20 0840 01/11/20 1030   BP: 155/79 122/68   Pulse: 90 87   Patient Position - Orthostatic VS: Lying          Visual Acuity      ED Medications  Medications   sodium chloride 0 9 % bolus 1,000 mL (1,000 mL Intravenous New Bag 1/11/20 0935)   ketorolac (TORADOL) injection 30 mg (30 mg Intravenous Given 1/11/20 0939)   haloperidol lactate (HALDOL) injection 5 mg (5 mg Intramuscular Given 1/11/20 0942)   diphenhydrAMINE (BENADRYL) injection 25 mg (25 mg Intravenous Given 1/11/20 0937)   ondansetron (ZOFRAN) injection 4 mg (4 mg Intravenous Given 1/11/20 0937)       Diagnostic Studies  Results Reviewed     Procedure Component Value Units Date/Time    Bilirubin, direct [[id number]]  (Normal) Collected:  01/11/20 0929    Lab Status:  Final result Specimen:  Blood from Arm, Right Updated:  01/11/20 1000     Bilirubin, Direct 0 10 mg/dL     Lipase [[id number]]  (Abnormal) Collected:  01/11/20 0929    Lab Status:  Final result Specimen:  Blood from Arm, Right Updated:  01/11/20 1000     Lipase <10 u/L     Comprehensive metabolic panel [[id number]]  (Abnormal) Collected:  01/11/20 0929    Lab Status:  Final result Specimen:  Blood from Arm, Right Updated:  01/11/20 1000     Sodium 138 mmol/L      Potassium 4 0 mmol/L      Chloride 104 mmol/L      CO2 23 mmol/L      ANION GAP 11 mmol/L      BUN 14 mg/dL      Creatinine 1 03 mg/dL      Glucose 101 mg/dL      Calcium 9 6 mg/dL      AST 10 U/L      ALT 9 U/L      Alkaline Phosphatase 81 U/L      Total Protein 7 9 g/dL      Albumin 4 2 g/dL      Total Bilirubin 0 30 mg/dL      eGFR 76 ml/min/1 73sq m     Narrative:       [place] guidelines for Chronic Kidney Disease (CKD):     Stage 1 with normal or high GFR (GFR > 90 mL/min/1 73 square meters)    Stage 2 Mild CKD (GFR = 60-89 mL/min/1 73 square meters)    Stage 3A Moderate CKD (GFR = 45-59 mL/min/1 73 square meters)    Stage 3B Moderate CKD (GFR = 30-44 mL/min/1 73 square meters)    Stage 4 Severe CKD (GFR = 15-29 mL/min/1 73 square meters)    Stage 5 End Stage CKD (GFR <15 mL/min/1 73 square meters)  Note: GFR calculation is accurate only with a steady state creatinine    CBC and differential [[id number]]  (Abnormal) Collected:  01/11/20 0928    Lab Status:  Final result Specimen:  Blood from Arm, Right Updated:  01/11/20 0937     WBC 15 30 Thousand/uL      RBC 5 08 Million/uL      Hemoglobin 14 3 g/dL      Hematocrit 43 5 %      MCV 86 fL      MCH 28 1 pg      MCHC 32 8 g/dL      RDW 13 0 %      MPV 7 9 fL      Platelets 232 Thousands/uL      Neutrophils Relative 82 %      Lymphocytes Relative 10 %      Monocytes Relative 6 %      Eosinophils Relative 1 %      Basophils Relative 0 %      Neutrophils Absolute 12 50 Thousands/µL      Lymphocytes Absolute 1 60 Thousands/µL      Monocytes Absolute 1 00 Thousand/µL      Eosinophils Absolute 0 20 Thousand/µL      Basophils Absolute 0 10 Thousands/µL     UA w Reflex to Microscopic w Reflex to Culture [[id number]]     Lab Status:  No result Specimen:  Urine, Clean Catch                  Ultrasound pelvis complete   Final Result by Vinny Wyman MD (01/11 1037)       Unremarkable transabdominal pelvic ultrasound examination  Workstation performed: [id number]                    Procedures  Procedures         ED Course                               MDM  Number of Diagnoses or Management Options  Abdominal pain:   Diagnosis management comments: Will repeat labs given that I do not have labs more recent than the middle of last year  She also had positive leukocytes on her prior urinalysis    Will also get an ultrasound given that she is pending a test discussed treatment with Haldol given that she has been extensively tested and an organic source of her symptoms has not been found and with chronic recurrent abdominal pain held all is often symptomatically helpful  Patient understands that she will feel sedated and will take precautions    1143 pain has completely resolved with the Haldol she was given        Disposition  Final diagnoses:   Abdominal pain     Time reflects when diagnosis was documented in both MDM as applicable and the Disposition within this note     Time User Action Codes Description Comment    1/11/2020 11:41 AM Lois Post Add [R10 9] Abdominal pain       ED Disposition     ED Disposition Condition Date/Time Comment    Discharge Stable Sat Jan 11, 2020 11:41 AM Vernita Schlatter discharge to home/self care  Follow-up Information     Follow up With Specialties Details Why Contact Info    Primary Care Provider - see in next 48 hours  Go in 2 days As needed, If symptoms worsen or new symptoms develop           Patient's Medications   Discharge Prescriptions    No medications on file     No discharge procedures on file      ED Provider  Electronically Signed by           Tory Banks MD  01/11/20 3154

## 2021-02-26 ENCOUNTER — HOSPITAL ENCOUNTER (OUTPATIENT)
Dept: MRI IMAGING | Facility: HOSPITAL | Age: 26
Discharge: HOME | End: 2021-02-26
Attending: ANESTHESIOLOGY
Payer: COMMERCIAL

## 2021-02-26 ENCOUNTER — TRANSCRIBE ORDERS (OUTPATIENT)
Dept: ADMINISTRATIVE | Facility: HOSPITAL | Age: 26
End: 2021-02-26

## 2021-02-26 DIAGNOSIS — H54.62 UNQUALIFIED VISUAL LOSS, LEFT EYE, NORMAL VISION RIGHT EYE: Primary | ICD-10-CM

## 2021-02-26 DIAGNOSIS — H57.13 OCULAR PAIN, BILATERAL: ICD-10-CM

## 2021-02-26 DIAGNOSIS — H54.62 UNQUALIFIED VISUAL LOSS, LEFT EYE, NORMAL VISION RIGHT EYE: ICD-10-CM

## 2021-02-26 PROCEDURE — A9585 GADOBUTROL INJECTION: HCPCS | Performed by: ANESTHESIOLOGY

## 2021-02-26 PROCEDURE — 70543 MRI ORBT/FAC/NCK W/O &W/DYE: CPT

## 2021-02-26 PROCEDURE — 70553 MRI BRAIN STEM W/O & W/DYE: CPT

## 2021-02-26 PROCEDURE — G1004 CDSM NDSC: HCPCS

## 2021-02-26 RX ADMIN — GADOBUTROL 6 ML: 604.72 INJECTION INTRAVENOUS at 14:37

## 2021-03-10 DIAGNOSIS — Z23 ENCOUNTER FOR IMMUNIZATION: ICD-10-CM

## 2022-08-30 ENCOUNTER — CONSULT (OUTPATIENT)
Dept: BARIATRICS | Facility: CLINIC | Age: 27
End: 2022-08-30
Payer: COMMERCIAL

## 2022-08-30 VITALS
HEART RATE: 84 BPM | OXYGEN SATURATION: 97 % | TEMPERATURE: 97.6 F | HEIGHT: 64 IN | SYSTOLIC BLOOD PRESSURE: 118 MMHG | DIASTOLIC BLOOD PRESSURE: 70 MMHG | WEIGHT: 187.1 LBS | BODY MASS INDEX: 31.94 KG/M2

## 2022-08-30 DIAGNOSIS — E66.9 OBESITY, CLASS I, BMI 30-34.9: Primary | ICD-10-CM

## 2022-08-30 DIAGNOSIS — I47.1 SVT (SUPRAVENTRICULAR TACHYCARDIA) (HCC): ICD-10-CM

## 2022-08-30 DIAGNOSIS — G47.33 MILD OBSTRUCTIVE SLEEP APNEA: ICD-10-CM

## 2022-08-30 PROBLEM — E53.8 B12 DEFICIENCY: Status: ACTIVE | Noted: 2021-07-01

## 2022-08-30 PROBLEM — G43.009 MIGRAINE WITHOUT AURA AND WITHOUT STATUS MIGRAINOSUS, NOT INTRACTABLE: Status: ACTIVE | Noted: 2021-07-01

## 2022-08-30 PROBLEM — H46.9 OPTIC NEURITIS: Status: ACTIVE | Noted: 2021-03-03

## 2022-08-30 PROBLEM — R20.2 PARESTHESIA: Status: ACTIVE | Noted: 2022-01-20

## 2022-08-30 PROBLEM — R00.2 PALPITATIONS: Status: ACTIVE | Noted: 2022-04-18

## 2022-08-30 PROBLEM — E55.9 VITAMIN D DEFICIENCY: Status: ACTIVE | Noted: 2021-07-01

## 2022-08-30 PROCEDURE — 99204 OFFICE O/P NEW MOD 45 MIN: CPT | Performed by: PHYSICIAN ASSISTANT

## 2022-08-30 RX ORDER — VENLAFAXINE HYDROCHLORIDE 150 MG/1
CAPSULE, EXTENDED RELEASE ORAL
COMMUNITY
Start: 2022-08-28

## 2022-08-30 RX ORDER — AMOXICILLIN AND CLAVULANATE POTASSIUM 875; 125 MG/1; MG/1
1 TABLET, FILM COATED ORAL 2 TIMES DAILY
COMMUNITY
Start: 2022-08-26 | End: 2022-09-05

## 2022-08-30 RX ORDER — FLUTICASONE PROPIONATE 50 MCG
2 SPRAY, SUSPENSION (ML) NASAL DAILY
COMMUNITY
Start: 2022-08-26

## 2022-08-30 NOTE — PROGRESS NOTES
Assessment/Plan:    Obesity, Class I, BMI 30-34 9  -Discussed options of HealthyCORE-Intensive Lifestyle Intervention Program, Very Low Calorie Diet-VLCD and Conservative Program and the role of weight loss medications   -Initial weight loss goal of 5-10% weight loss for improved health  -Screening labs  Recommend checking lab coverage before having labs drawn   -Labs reviewed from 12/14/21  - STOP BANG-1/8    -Patient is interested in pursuing conservative plan with body stats package  Goals;  Continue tracking meals-not calorie logging as it increases her anxiety  Would recommend 1000 calories on nonactive days and 1250 on active days  -try to increase protein with meals  Discussed doing greek yogurt and ranch powder instead of ranch dip or adding on protein shake in the AM  No sugary beverages  At least 64oz of water daily-doing well with  Increase physical activity by 10 minutes daily  Gradually increase physical activity to a goal of 5 days per week for 30 minutes of MODERATE intensity PLUS 2 days per week of FULL BODY resistance training-continue exercise          Mild obstructive sleep apnea  Not treated with CPAP or oral device  May improve with weight loss    SVT (supraventricular tachycardia) ([facility] [address] )  On metoprolol  Did discuss this medication can promote weight gain  But she had gained most medication prior and has been stable since starting it  Follow up in approximately 2 months with Non-Surgical Physician/Advanced Practitioner  Diagnoses and all orders for this visit:    Obesity, Class I, BMI 30-34 9    Mild obstructive sleep apnea    SVT (supraventricular tachycardia) ([facility])    Other orders  -     amoxicillin-clavulanate (AUGMENTIN) 875-125 mg per tablet;  Take 1 tablet by mouth 2 (two) times a day  -     fluticasone (FLONASE) 50 mcg/act nasal spray; 2 sprays into each nostril daily  -     metoprolol tartrate (LOPRESSOR) 25 mg tablet  -     venlafaxine (EFFEXOR-XR) 150 mg 24 hr capsule  - Ubrogepant (UBRELVY) 50 MG tablet; Take 50 mg by mouth          Subjective:   Chief Complaint   Patient presents with    Consult     MWM goal wt 145, waist 37 2, S/B 1-8 neg         Patient ID: Courtney Quinones  is a 32 y o  female with excess weight/obesity here to pursue weight management  Past Medical History:   Diagnosis Date    Autism     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[facility] )     POTS (postural orthostatic tachycardia syndrome)        HPI:Here for MWM consult    She gained weight rapidly when dx with POTS 3 years ago, about 40 pounds in 6 months  S    Had been food logging  Tries to stay active  Tried intermittent fasting  She is unsure why she gained and why she cant lose    Obesity/Excess Weight:  Severity: class I  Onset:  3 years  Since she was diagnosed with POTS  Gained 40 pounds in about 6 months  She has not gained much since then  Modifiers: Diet and Exercise  Contributing factors: Medications and POTS  Associated symptoms: comorbid conditions and decreased mobility    Goals:145-160  Hydration:water at least 80 oz a day but usually more  Liquid IV 3 times a week if dizzy, G0 once a day  Alcohol: none  Exercise:rides horses 2 times a week  Limited by POTs  Stationary bike 20 minutes a day  [id number] steps on nonactive days  Occupation:[employer] worker  Sleep:10 hours of sleep     Colonoscopy-Not applicable    Diet Recall:  B: yogurt (yoplait light) and granola  S: sometimes   L (around 1PM):carrot sticks and ranch  Sometimes a granola bar([facility])  Or 1/2 turkey sandwich  S:salty snack when coming home home from work  D: carb and meat  chicekn with cooking spray, rice and veggie  S: dessert-1-2 pieces of chocolate squares    The following portions of the patient's history were reviewed and updated as appropriate:   She  has a past medical history of Autism, 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[facility] ), and POTS (postural orthostatic tachycardia syndrome)    She   Patient Active Problem List    Diagnosis Date Noted    Obesity, Class I, BMI 30-34 9 08/30/2022    Palpitations 04/18/2022    SVT (supraventricular tachycardia) (HCC) 04/18/2022    Paresthesia 01/20/2022    B12 deficiency 07/01/2021    Migraine without aura and without status migrainosus, not intractable 07/01/2021    Vitamin D deficiency 07/01/2021    Optic neuritis 03/03/2021    Mild obstructive sleep apnea 11/05/2019    Anxiety disorder 07/30/2013    ADHD (attention deficit hyperactivity disorder), combined type 11/09/2012    Asperger's disorder 11/09/2012     She  has no past surgical history on file  Her family history includes Cancer in her paternal grandfather; Heart failure in her father and paternal grandfather  She  reports that she has never smoked  She has never used smokeless tobacco  She reports that she does not drink alcohol and does not use drugs  Current Outpatient Medications   Medication Sig Dispense Refill    ALPRAZolam (XANAX) 0 25 mg tablet Take by mouth      amoxicillin-clavulanate (AUGMENTIN) 875-125 mg per tablet Take 1 tablet by mouth 2 (two) times a day      busPIRone (BUSPAR) 10 mg tablet Take 10 mg by mouth daily      escitalopram (LEXAPRO) 10 mg tablet Take 20 mg by mouth daily       fluticasone (FLONASE) 50 mcg/act nasal spray 2 sprays into each nostril daily      metoprolol tartrate (LOPRESSOR) 25 mg tablet       norgestimate-ethinyl estradiol (ORTHO-CYCLEN) 0 25-35 MG-MCG per tablet Take by mouth      ondansetron (ZOFRAN) 4 mg tablet Take 4 mg by mouth every 8 (eight) hours as needed for nausea or vomiting      Ubrogepant (UBRELVY) 50 MG tablet Take 50 mg by mouth      venlafaxine (EFFEXOR-XR) 150 mg 24 hr capsule        No current facility-administered medications for this visit       Current Outpatient Medications on File Prior to Visit   Medication Sig    ALPRAZolam (XANAX) 0 25 mg tablet Take by mouth    amoxicillin-clavulanate (AUGMENTIN) 875-125 mg per tablet Take 1 tablet by mouth 2 (two) times a day    busPIRone (BUSPAR) 10 mg tablet Take 10 mg by mouth daily    escitalopram (LEXAPRO) 10 mg tablet Take 20 mg by mouth daily     fluticasone (FLONASE) 50 mcg/act nasal spray 2 sprays into each nostril daily    metoprolol tartrate (LOPRESSOR) 25 mg tablet     norgestimate-ethinyl estradiol (ORTHO-CYCLEN) 0 25-35 MG-MCG per tablet Take by mouth    ondansetron (ZOFRAN) 4 mg tablet Take 4 mg by mouth every 8 (eight) hours as needed for nausea or vomiting    Ubrogepant (UBRELVY) 50 MG tablet Take 50 mg by mouth    venlafaxine (EFFEXOR-XR) 150 mg 24 hr capsule     [DISCONTINUED] venlafaxine (EFFEXOR) 100 MG tablet Take 150 mg by mouth daily 150mg po HS  37mg po in AM (Patient not taking: Reported on 8/30/2022)     No current facility-administered medications on file prior to visit  She is allergic to ibuprofen       Review of Systems   Constitutional: Positive for fatigue  Negative for chills and fever  Respiratory: Negative for shortness of breath  Cardiovascular: Positive for palpitations  Negative for chest pain  Gastrointestinal: Negative for abdominal pain, constipation, diarrhea and vomiting  Genitourinary: Negative for difficulty urinating  Skin: Negative for rash  Neurological: Positive for dizziness, numbness and headaches  Psychiatric/Behavioral: The patient is not nervous/anxious  Objective:    /70   Pulse 84   Temp 97 6 °F (36 4 °C) (Tympanic)   Ht 5' 4 2" (1 631 m)   Wt 84 9 kg (187 lb 1 6 oz)   SpO2 97%   BMI 31 92 kg/m²     Physical Exam  Vitals and nursing note reviewed  Constitutional:       General: She is not in acute distress  Appearance: She is well-developed  She is obese  HENT:      Head: Normocephalic and atraumatic  Eyes:      Conjunctiva/sclera: Conjunctivae normal    Neck:      Thyroid: No thyromegaly  Pulmonary:      Effort: Pulmonary effort is normal  No respiratory distress  Skin:     Findings: No rash (visible)  Neurological:      Mental Status: She is alert and oriented to person, place, and time     Psychiatric:         Behavior: Behavior normal

## 2022-08-30 NOTE — ASSESSMENT & PLAN NOTE
-Discussed options of HealthyCORE-Intensive Lifestyle Intervention Program, Very Low Calorie Diet-VLCD and Conservative Program and the role of weight loss medications   -Initial weight loss goal of 5-10% weight loss for improved health  -Screening labs  Recommend checking lab coverage before having labs drawn   -Labs reviewed from 12/14/21  - LETICIA BANG-1/8    -Patient is interested in pursuing conservative plan with body stats package  Goals;  Continue tracking meals-not calorie logging as it increases her anxiety  Would recommend 1000 calories on nonactive days and 1250 on active days  -try to increase protein with meals  Discussed doing greek yogurt and ranch powder instead of ranch dip or adding on protein shake in the AM  No sugary beverages  At least 64oz of water daily-doing well with  Increase physical activity by 10 minutes daily   Gradually increase physical activity to a goal of 5 days per week for 30 minutes of MODERATE intensity PLUS 2 days per week of FULL BODY resistance training-continue exercise

## 2022-08-30 NOTE — ASSESSMENT & PLAN NOTE
On metoprolol  Did discuss this medication can promote weight gain  But she had gained most medication prior and has been stable since starting it

## 2022-09-19 ENCOUNTER — OFFICE VISIT (OUTPATIENT)
Dept: BARIATRICS | Facility: CLINIC | Age: 27
End: 2022-09-19

## 2022-09-19 VITALS — HEIGHT: 64 IN | WEIGHT: 187 LBS | BODY MASS INDEX: 31.92 KG/M2

## 2022-09-19 DIAGNOSIS — R63.5 ABNORMAL WEIGHT GAIN: ICD-10-CM

## 2022-09-19 PROCEDURE — RECHECK

## 2022-09-19 PROCEDURE — BODSTAT PR BODY STAT

## 2022-09-19 NOTE — PROGRESS NOTES
Weight Management Medical Nutrition Assessment  Deisi Robledo is here for Body Stats  Seen by PA ~3 wks ago  Current wt: 187 lbs  She has maintained her weight x 3 wks  Seca completed, results reviewed  Appears underhydrated but she reports she had food poisoning 2 days ago with vomiting which may be contributing  REE 6% < predicted  Reported to PA at consult that tracking increases her anxiety  Writing in a notebook has been more manageable  Also trying to look up healthy substitutes  Reports she is consuming 1000 calories on rest days but feels hungry  Some issues with textures due to autism  Requires more sodium due to dx POTS  Uses liquid IV 3x/wk and Gatorade zero once daily  Based on recall would benefit from higher protein and decreasing carbs  Meal plan and other resources provided  She declined to f/u with me at this time but will be following up with PA       Dislikes: eggs, fish, cottage cheese    Patient seen by Medical Provider in past 6 months:  yes  Requested to schedule appointment with Medical Provider: No    Anthropometric Measurements  Start Weight (#): 187 1 lbs 8/30/22  Current Weight (#): 187 lbs  TBW % Change from start weight: -  Ideal Body Weight (#): 146 4 lbs BMI 25 (120 lbs 64 2")  Goal Weight (#): 145-160 lbs   Highest: 191lbs  Lowest: 135 lbs    Weight Loss History  Previous weight loss attempts: Exercise  Self Created Diets (Portion Control, Healthy Food Choices, etc )  Weight Watchers  IF    Food and Nutrition Related History  Wake up: 7:15-9:00 (usually 7:15)  Bed Time: 11-12    Food Recall  Breakfast: within an hour of waking: yoplait greek yogurt 80 osei non greek, protein granola, sometimes whey protein powder OR 1 low osei waffle, 1 serving turkey sausage, sf syrup OR Pashto toast (1-2 slice bread whole grain, dipped in egg), sf syrup OR 1 container greek yogurt, 1 1/2 cup fruit, honey, splash oj  Snack: 10:30-11:00: handful cashews   Lunch: 1:00 p m : carrot sticks, plain greek yogurt w/ranch 1/2 container OR s/w on [employer] [place], 1-2 slices deli turkey or buffalo chicken, 1-2 salami  Snack: skip OR 6:00 sometimes salty (baked pringles 1 serving or popcorn)  Dinner: 7:00: ~3 oz protein, sometimes carb, half plate veggies   Snack: snack size chocolate 2 pieces     Beverages: water and sugar free beverages  Volume of beverage intake: gatorade zero at least 1x/day    Weekends: Same but sometimes due to fatigue will eat more for dinner  Cravings:  Pizza, chocolate  Trouble area of day:  Midafternoon (naps 3-6)    Frequency of Eating out: irregularly  Food restrictions: kiwi  Cooking: self   Food Shopping: self    Physical Activity Intake  Activity:equestrian & taking care of horses, stationary bike 5-10 minutes/day  Frequency:1-2x/wk  Physical limitations/barriers to exercise: POTS    Estimated Needs  Energy  [address]55, wt loss w/o exercise 7906-8337, w/exercise 6801-1508  SECA: BMR: 1744     X 1 3 -1000 =1267  [employer] Energy Needs: BMR : 6971  1-2# loss weekly sedentary: [phone]             1-2# loss weekly lightly active: 6357-0847  Maintenance calories for sedentary activity level: 1893  Protein: 65-82 gm     (1 2-1 5g/kg IBW)  Fluid: 64 oz     (35mL/kg IBW)    Nutrition Diagnosis  Yes; Overweight/obesity  related to Excess energy intake as evidenced by  BMI more than normative standard for age and sex (obesity-grade I 26-30  9)       Nutrition Intervention    Nutrition Prescription  Calories: 4621-6241 (increase to 1400 on exercise)  Protein: 65-90 gm    Meal Plan ([employer])  Breakfast: 200-250 (350 exercise), 12-20  Snack:  100-150, 5-10  Lunch: 200-250 (275 on exercise), 15-20  Snack: 100-150, 5-10  Dinner: 275-300 (400 on exercise), 20-35  Snack: 100-150, 5-10    Nutrition Education:    Calorie controlled menu  Lean protein food choices  Healthy snack options  Food journaling tips    Nutrition Counseling:  Strategies: meal planning, portion sizes, healthy snack choices, hydration, fiber intake, protein intake, exercise, food journal    Monitoring and Evaluation:  Evaluation criteria:  Energy Intake  Meet protein needs  Maintain adequate hydration  Monitor weekly weight  Meal planning/preparation  Food journal   Decreased portions at mealtimes and snacks  Physical activity     Barriers to learning:none  Readiness to change: Preparation:  (Getting ready to change)   Comprehension: good  Expected Compliance: good

## 2022-12-16 ENCOUNTER — OFFICE VISIT (OUTPATIENT)
Dept: BARIATRICS | Facility: CLINIC | Age: 27
End: 2022-12-16

## 2022-12-16 VITALS
WEIGHT: 184 LBS | HEIGHT: 64 IN | SYSTOLIC BLOOD PRESSURE: 120 MMHG | RESPIRATION RATE: 16 BRPM | BODY MASS INDEX: 31.41 KG/M2 | DIASTOLIC BLOOD PRESSURE: 84 MMHG | HEART RATE: 81 BPM

## 2022-12-16 DIAGNOSIS — E66.9 OBESITY, CLASS I, BMI 30-34.9: Primary | ICD-10-CM

## 2022-12-16 DIAGNOSIS — G90.A POTS (POSTURAL ORTHOSTATIC TACHYCARDIA SYNDROME): ICD-10-CM

## 2022-12-16 RX ORDER — TOPIRAMATE 25 MG/1
25 TABLET ORAL 2 TIMES DAILY
COMMUNITY
Start: 2022-12-05

## 2022-12-16 NOTE — ASSESSMENT & PLAN NOTE
Patient is interested in pursuing conservative plan with body stats package    Initial:187 1  Current:184  Change:-3 1  Goal:      Goals;  Continue tracking meals-not calorie logging as it increases her anxiety  1200 calories  -continue with trying to increase protein, discussed if not having protein with meals to have protein based snack and protein based snack list provided  -continue with water intake-has increased since SECA  -continue current exercise    To start saxenda to help with hunger  Discussed the need to continue to eat on a regular basis and avoid skipping meals  Patient denies personal and family history of MCT and MEN2 tumors  Patient denies personal history of pancreatitis  Side effects discussed but not limited to diarrhea, bloating, constipation, GI upset, heartburn, increased heart rate, headache, low blood sugar, fatigue and dizziness  Titration and medication administration discussed

## 2022-12-16 NOTE — PROGRESS NOTES
Assessment/Plan:    Obesity, Class I, BMI 30-34 9  Patient is interested in pursuing conservative plan with body stats package    Initial:187 1  Current:184  Change:-3 1  Goal:      Goals;  Continue tracking meals-not calorie logging as it increases her anxiety  1200 calories  -continue with trying to increase protein, discussed if not having protein with meals to have protein based snack and protein based snack list provided  -continue with water intake-has increased since SECA  -continue current exercise    To start saxenda to help with hunger  Discussed the need to continue to eat on a regular basis and avoid skipping meals  Patient denies personal and family history of MCT and MEN2 tumors  Patient denies personal history of pancreatitis  Side effects discussed but not limited to diarrhea, bloating, constipation, GI upset, heartburn, increased heart rate, headache, low blood sugar, fatigue and dizziness  Titration and medication administration discussed  POTS (postural orthostatic tachycardia syndrome)  Continue followup with cardiology and increased sodium/water intake        Follow up in approximately 6 weeks with Non-Surgical Physician/Advanced Practitioner  Diagnoses and all orders for this visit:    Obesity, Class I, BMI 30-34 9  -     liraglutide (SAXENDA) injection; Inject subcutaneously WEEK 1 use 0 6mg day,  WEEK 2 use 1 2mg day, WEEK 3 use 1 8mg day, WEEK 4 use 2 4mg day, WEEK 5 use 3mg day  -     Insulin Pen Needle 32G X 4 MM MISC; Use daily    POTS (postural orthostatic tachycardia syndrome)    Other orders  -     Cholecalciferol 25 MCG (1000 UT) tablet; Take 2,000 Units by mouth daily  -     topiramate (TOPAMAX) 25 mg tablet; Take 25 mg by mouth 2 (two) times a day          Subjective:   Chief Complaint   Patient presents with   • Follow-up     MWM 2mth f/u; waist 35in        Patient ID: Kesha Hardwick  is a 32 y o  female with excess weight/obesity here to pursue weight managment  Patient is pursuing Conservative Program      HPI Here for MWM followup with service dog Josee  She did see the dietician in september  And prior in august and they did recommend increasing calories and protein  She was food tracking to 1000 calories a day  She did increase to 1200 calories and increased protein but has seen little change  She is trying to be as active as possible    She is feeling hungry regardless of what she eats  She does not feel satisfied easily when eating   Wt Readings from Last 10 Encounters:   22 83 5 kg (184 lb)   22 84 8 kg (187 lb)   22 84 9 kg (187 lb 1 6 oz)   19 68 kg (150 lb)   17 58 1 kg (128 lb)   17 57 6 kg (127 lb)       Food loggin calories most days, 2000 calories when riding 3 days a week  Increased appetite/cravings:yes  Fruit/Vegetable servings:  Exercise: doing mo protocol for pots, biking  Hydration: increased to 3 L water daily    Diet Recall:  B (150): yogurt and granola or protein bagel (toasted with light butter)  S:sometimes snack bar special K  L: 1/2 sandwich(lite bread maybe 12 grain, salami/turkey 2 oz, mustard) and carrot sticks w/vegetable dip  S:if having something sweet  D:adding larger portion for inner and smaller for B/L    Chicken, vegetable and less carbs    Colonoscopy-Not applicable    The following portions of the patient's history were reviewed and updated as appropriate: She   Patient Active Problem List    Diagnosis Date Noted   • POTS (postural orthostatic tachycardia syndrome)    • Obesity, Class I, BMI 30-34 9 2022   • Palpitations 2022   • SVT (supraventricular tachycardia) ([facility] [address] ) 2022   • Paresthesia 2022   • B12 deficiency 2021   • Migraine without aura and without status migrainosus, not intractable 2021   • Vitamin D deficiency 2021   • Optic neuritis 2021   • Mild obstructive sleep apnea 2019   • Anxiety disorder 2013   • ADHD (attention deficit hyperactivity disorder), combined type 11/09/2012   • Asperger's disorder 11/09/2012     She  has no past surgical history on file  Her family history includes Cancer in her paternal grandfather; Heart failure in her paternal grandfather; Hypothyroidism in her father  She  reports that she has never smoked  She has never used smokeless tobacco  She reports that she does not drink alcohol and does not use drugs  Current Outpatient Medications   Medication Sig Dispense Refill   • ALPRAZolam (XANAX) 0 25 mg tablet Take by mouth     • Cholecalciferol 25 MCG (1000 UT) tablet Take 2,000 Units by mouth daily     • Insulin Pen Needle 32G X 4 MM MISC Use daily 100 each 0   • liraglutide (SAXENDA) injection Inject subcutaneously WEEK 1 use 0 6mg day,  WEEK 2 use 1 2mg day, WEEK 3 use 1 8mg day, WEEK 4 use 2 4mg day, WEEK 5 use 3mg day 15 mL 1   • metoprolol tartrate (LOPRESSOR) 25 mg tablet      • ondansetron (ZOFRAN) 4 mg tablet Take 4 mg by mouth every 8 (eight) hours as needed for nausea or vomiting     • topiramate (TOPAMAX) 25 mg tablet Take 25 mg by mouth 2 (two) times a day     • Ubrogepant (UBRELVY) 50 MG tablet Take 50 mg by mouth     • venlafaxine (EFFEXOR-XR) 150 mg 24 hr capsule        No current facility-administered medications for this visit       Current Outpatient Medications on File Prior to Visit   Medication Sig   • ALPRAZolam (XANAX) 0 25 mg tablet Take by mouth   • Cholecalciferol 25 MCG (1000 UT) tablet Take 2,000 Units by mouth daily   • metoprolol tartrate (LOPRESSOR) 25 mg tablet    • ondansetron (ZOFRAN) 4 mg tablet Take 4 mg by mouth every 8 (eight) hours as needed for nausea or vomiting   • topiramate (TOPAMAX) 25 mg tablet Take 25 mg by mouth 2 (two) times a day   • Ubrogepant (UBRELVY) 50 MG tablet Take 50 mg by mouth   • venlafaxine (EFFEXOR-XR) 150 mg 24 hr capsule    • [DISCONTINUED] busPIRone (BUSPAR) 10 mg tablet Take 10 mg by mouth daily   • [DISCONTINUED] escitalopram (LEXAPRO) 10 mg tablet Take 20 mg by mouth daily    • [DISCONTINUED] fluticasone (FLONASE) 50 mcg/act nasal spray 2 sprays into each nostril daily   • [DISCONTINUED] norgestimate-ethinyl estradiol (ORTHO-CYCLEN) 0 25-35 MG-MCG per tablet Take by mouth     No current facility-administered medications on file prior to visit  She is allergic to ibuprofen       Review of Systems   Constitutional: Positive for fatigue  Negative for chills and fever  Respiratory: Negative for shortness of breath  Cardiovascular: Positive for palpitations  Negative for chest pain  Gastrointestinal: Negative for abdominal pain, constipation, diarrhea and vomiting  Genitourinary: Negative for difficulty urinating  Musculoskeletal: Negative for arthralgias and back pain  Skin: Negative for rash  Neurological: Positive for dizziness, numbness and headaches  Psychiatric/Behavioral: Negative for dysphoric mood  The patient is not nervous/anxious  Objective:    /84   Pulse 81   Resp 16   Ht 5' 4 25" (1 632 m)   Wt 83 5 kg (184 lb)   BMI 31 34 kg/m²      Physical Exam  Vitals and nursing note reviewed  Constitutional:       General: She is not in acute distress  Appearance: She is well-developed  She is obese  HENT:      Head: Normocephalic and atraumatic  Eyes:      Conjunctiva/sclera: Conjunctivae normal    Neck:      Thyroid: No thyromegaly  Pulmonary:      Effort: Pulmonary effort is normal  No respiratory distress  Skin:     Findings: No rash (visible)  Neurological:      Mental Status: She is alert and oriented to person, place, and time     Psychiatric:         Mood and Affect: Mood normal          Behavior: Behavior normal

## 2023-02-03 ENCOUNTER — OFFICE VISIT (OUTPATIENT)
Dept: BARIATRICS | Facility: CLINIC | Age: 28
End: 2023-02-03

## 2023-02-03 VITALS
HEIGHT: 64 IN | HEART RATE: 93 BPM | BODY MASS INDEX: 32.03 KG/M2 | DIASTOLIC BLOOD PRESSURE: 68 MMHG | WEIGHT: 187.6 LBS | SYSTOLIC BLOOD PRESSURE: 118 MMHG

## 2023-02-03 DIAGNOSIS — E66.9 OBESITY, CLASS I, BMI 30-34.9: Primary | ICD-10-CM

## 2023-02-03 DIAGNOSIS — G90.A POTS (POSTURAL ORTHOSTATIC TACHYCARDIA SYNDROME): ICD-10-CM

## 2023-02-03 DIAGNOSIS — G47.33 MILD OBSTRUCTIVE SLEEP APNEA: ICD-10-CM

## 2023-02-03 RX ORDER — MIRTAZAPINE 7.5 MG/1
7.5 TABLET, FILM COATED ORAL
COMMUNITY
Start: 2023-01-28

## 2023-02-03 NOTE — ASSESSMENT & PLAN NOTE
Patient is interested in pursuing conservative plan   Did do  body stats package prior    Initial:187 1  Current:187 6  Change:+0 5lb (+3 6lb from last OV)  Goal: wanted to improve strenght      Goals;  Continue tracking meals-not calorie logging as it increases her anxiety  1200 calories would be the goal  -continue with trying to increase protein, discussed if not having protein with meals to have protein based snack and protein based snack list provided  -try to increase water to at least 2 L a day  -discussed returning back to exercise at least 3 times a week    Discussed starting on  saxenda to help with hunger  She will contact the pharmacy to see if it available  Discussed monitoring heart rate due to POTS history and she does have smart watch to do so  Discussed the need to continue to eat on a regular basis and avoid skipping meals  Patient denies personal and family history of MCT and MEN2 tumors  Patient denies personal history of pancreatitis  Side effects discussed but not limited to diarrhea, bloating, constipation, GI upset, heartburn, increased heart rate, headache, low blood sugar, fatigue and dizziness  Titration and medication administration discussed

## 2023-02-03 NOTE — PROGRESS NOTES
Assessment/Plan:    Obesity, Class I, BMI 30-34 9  Patient is interested in pursuing conservative plan   Did do  body stats package prior    Initial:187 1  Current:187 6  Change:+0 5lb (+3 6lb from last OV)  Goal: wanted to improve strenght      Goals;  Continue tracking meals-not calorie logging as it increases her anxiety  1200 calories would be the goal  -continue with trying to increase protein, discussed if not having protein with meals to have protein based snack and protein based snack list provided  -try to increase water to at least 2 L a day  -discussed returning back to exercise at least 3 times a week    Discussed starting on  saxenda to help with hunger  She will contact the pharmacy to see if it available  Discussed monitoring heart rate due to POTS history and she does have smart watch to do so  Discussed the need to continue to eat on a regular basis and avoid skipping meals  Patient denies personal and family history of MCT and MEN2 tumors  Patient denies personal history of pancreatitis  Side effects discussed but not limited to diarrhea, bloating, constipation, GI upset, heartburn, increased heart rate, headache, low blood sugar, fatigue and dizziness  Titration and medication administration discussed  POTS (postural orthostatic tachycardia syndrome)  Continue followup with cardiology and increased sodium/water intake  She has been stable  Discussed restarting Davis exercises    Mild obstructive sleep apnea  Not treated with CPAP or oral device  May improve with weight loss          Follow up in approximately 2 months with Non-Surgical Physician/Advanced Practitioner  Diagnoses and all orders for this visit:    Obesity, Class I, BMI 30-34 9    POTS (postural orthostatic tachycardia syndrome)    Mild obstructive sleep apnea    Other orders  -     mirtazapine (REMERON) 7 5 MG tablet;  Take 7 5 mg by mouth daily at bedtime (Patient not taking: Reported on 2/3/2023)          Subjective: Chief Complaint   Patient presents with   • Follow-up     MWM 6 WEEK F/U        Patient ID: Dayron Cruz  is a 32 y o  female with excess weight/obesity here to pursue weight managment  Patient is pursuing Conservative Program      HPI  Here for MWM followup  She was not able to obtain the saxenda because her pharmacy didn't have it available  She still is interested in starting on ti  She has been more off track recent though  Her grandfather just passes away and she has felt more depressed  She is talking to her therapist regularly  She is still trying to make good food choices  She is having more carbs though than before though    Goal this year was to increase exercise and stregth  Wt Readings from Last 10 Encounters:   02/03/23 85 1 kg (187 lb 9 6 oz)   12/16/22 83 5 kg (184 lb)   09/19/22 84 8 kg (187 lb)   08/30/22 84 9 kg (187 lb 1 6 oz)   06/27/19 68 kg (150 lb)   04/18/17 58 1 kg (128 lb)   01/05/17 57 6 kg (127 lb)       Food logging:not currently  Increased appetite/cravings:yes  Fruit/Vegetable servings:  Exercise: not as consistent now  Hydration: increased to 3 L water daily  1 liquid IV pack a day    Diet Recall:  B:Honey nut cheeriors, milk and protein poder  L:Lunch meat  D:1 cup whole grain pasta, tomato sause, 1 sliche chiken and 1 slices cheese  S: sometimes  Colonoscopy-Not applicable    The following portions of the patient's history were reviewed and updated as appropriate:   She  has a past medical history of Autism, 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[address] ), and POTS (postural orthostatic tachycardia syndrome)    She   Patient Active Problem List    Diagnosis Date Noted   • POTS (postural orthostatic tachycardia syndrome)    • Obesity, Class I, BMI 30-34 9 08/30/2022   • Palpitations 04/18/2022   • SVT (supraventricular tachycardia) ([address] ) 04/18/2022   • Paresthesia 01/20/2022   • B12 deficiency 07/01/2021   • Migraine without aura and without status migrainosus, not intractable 07/01/2021 • Vitamin D deficiency 07/01/2021   • Optic neuritis 03/03/2021   • Mild obstructive sleep apnea 11/05/2019   • Anxiety disorder 07/30/2013   • ADHD (attention deficit hyperactivity disorder), combined type 11/09/2012   • Asperger's disorder 11/09/2012     She  has no past surgical history on file  Her family history includes Cancer in her paternal grandfather; Heart failure in her paternal grandfather; Hypothyroidism in her father  She  reports that she has never smoked  She has never used smokeless tobacco  She reports that she does not drink alcohol and does not use drugs  Current Outpatient Medications   Medication Sig Dispense Refill   • ALPRAZolam (XANAX) 0 25 mg tablet Take by mouth     • Insulin Pen Needle 32G X 4 MM MISC Use daily 100 each 0   • liraglutide (SAXENDA) injection Inject subcutaneously WEEK 1 use 0 6mg day,  WEEK 2 use 1 2mg day, WEEK 3 use 1 8mg day, WEEK 4 use 2 4mg day, WEEK 5 use 3mg day 15 mL 1   • ondansetron (ZOFRAN) 4 mg tablet Take 4 mg by mouth every 8 (eight) hours as needed for nausea or vomiting     • topiramate (TOPAMAX) 25 mg tablet Take 25 mg by mouth 2 (two) times a day     • Ubrogepant (UBRELVY) 50 MG tablet Take 50 mg by mouth     • venlafaxine (EFFEXOR-XR) 150 mg 24 hr capsule      • Cholecalciferol 25 MCG (1000 UT) tablet Take 2,000 Units by mouth daily (Patient not taking: Reported on 2/3/2023)     • metoprolol tartrate (LOPRESSOR) 25 mg tablet  (Patient not taking: Reported on 2/3/2023)     • mirtazapine (REMERON) 7 5 MG tablet Take 7 5 mg by mouth daily at bedtime (Patient not taking: Reported on 2/3/2023)       No current facility-administered medications for this visit       Current Outpatient Medications on File Prior to Visit   Medication Sig   • ALPRAZolam (XANAX) 0 25 mg tablet Take by mouth   • Insulin Pen Needle 32G X 4 MM MISC Use daily   • liraglutide (SAXENDA) injection Inject subcutaneously WEEK 1 use 0 6mg day,  WEEK 2 use 1 2mg day, WEEK 3 use 1 8mg day, WEEK 4 use 2 4mg day, WEEK 5 use 3mg day   • ondansetron (ZOFRAN) 4 mg tablet Take 4 mg by mouth every 8 (eight) hours as needed for nausea or vomiting   • topiramate (TOPAMAX) 25 mg tablet Take 25 mg by mouth 2 (two) times a day   • Ubrogepant (UBRELVY) 50 MG tablet Take 50 mg by mouth   • venlafaxine (EFFEXOR-XR) 150 mg 24 hr capsule    • Cholecalciferol 25 MCG (1000 UT) tablet Take 2,000 Units by mouth daily (Patient not taking: Reported on 2/3/2023)   • metoprolol tartrate (LOPRESSOR) 25 mg tablet  (Patient not taking: Reported on 2/3/2023)   • mirtazapine (REMERON) 7 5 MG tablet Take 7 5 mg by mouth daily at bedtime (Patient not taking: Reported on 2/3/2023)     No current facility-administered medications on file prior to visit  She is allergic to ibuprofen       Review of Systems    Objective:    /68   Pulse 93   Ht 5' 4 2" (1 631 m)   Wt 85 1 kg (187 lb 9 6 oz)   BMI 32 00 kg/m²      Physical Exam

## 2023-02-03 NOTE — ASSESSMENT & PLAN NOTE
Continue followup with cardiology and increased sodium/water intake  She has been stable    Discussed restarting Davis patt

## 2023-03-27 ENCOUNTER — TELEPHONE (OUTPATIENT)
Dept: BARIATRICS | Facility: CLINIC | Age: 28
End: 2023-03-27

## 2023-03-27 NOTE — TELEPHONE ENCOUNTER
Good Day,    Patient calls and would like to begin on Kurtis Monteiro had discussed with patient but never started due to insurance issues  (Should be sent to [facility])  Pharmacy is aware to put medication through under secondary insurance, and a Prior Authorization will be required  Please review patient's chart and inform staff if, and/or, when ordered  Thank you

## 2023-04-07 ENCOUNTER — OFFICE VISIT (OUTPATIENT)
Dept: BARIATRICS | Facility: CLINIC | Age: 28
End: 2023-04-07

## 2023-04-07 VITALS
WEIGHT: 183.8 LBS | RESPIRATION RATE: 16 BRPM | SYSTOLIC BLOOD PRESSURE: 120 MMHG | HEART RATE: 77 BPM | HEIGHT: 64 IN | DIASTOLIC BLOOD PRESSURE: 78 MMHG | BODY MASS INDEX: 31.38 KG/M2

## 2023-04-07 DIAGNOSIS — R63.8 ABNORMAL CRAVING: ICD-10-CM

## 2023-04-07 DIAGNOSIS — G47.33 MILD OBSTRUCTIVE SLEEP APNEA: ICD-10-CM

## 2023-04-07 DIAGNOSIS — G90.A POTS (POSTURAL ORTHOSTATIC TACHYCARDIA SYNDROME): ICD-10-CM

## 2023-04-07 DIAGNOSIS — E66.9 OBESITY, CLASS I, BMI 30-34.9: Primary | ICD-10-CM

## 2023-04-07 NOTE — PROGRESS NOTES
Assessment/Plan:     Obesity, Class I, BMI 30-34 9  - Patient is pursuing conservative plan   Did body stats package prior   - Screening labs done 9/2022    Initial:187 1  Last visit:187 6  Current: 183 8 lbs BMI 31 35  Change: -3 3 lb (-3 8 lbs from last OV)  Goal: wanted to improve strength       Goals;  Continue tracking meals-not calorie logging as it increases her anxiety  1200 calories would be the goal  -Keep up the great work with getting protein with each meal   - Keep up the great work with water intake  - Keep up the great work with exercise  - Has been following diet and exercise regimen, but is struggling with appetite and cravings and would like to start Saxenda to help with that  Reviewed need to not skip meals given history of POTS  She will monitor her heart rate while on Saxenda  - Patient denies personal history of pancreatitis  Patient also denies personal and family history of thyroid cancer and multiple endocrine neoplasia type 2 (MEN 2 tumor)  - Side effects of Saxenda discussed: nausea, vomiting, diarrhea, and constipation  If severe abdominal pain develops, stop Saxenda and go to the ER, as this could be pancreatitis  Monitor heart rate while on Saxenda and if resting heart rate greater than 100 beats per minute, patient will notify me  - Not currently sexually active, but discussed that if she would become sexually active, some form of birth control recommended while on weight loss medications  POTS (postural orthostatic tachycardia syndrome)  - Follows with cardiology  Mild obstructive sleep apnea  - May improve with weight loss  Berenice Hu was seen today for follow-up  Diagnoses and all orders for this visit:    Obesity, Class I, BMI 30-34 9  -     liraglutide (SAXENDA) injection; Inject 0 6 mg subcutaneously once per day for the first week   Increase in weekly intervals by 0 6 mg until a dose of 3 mg is reached  -     Insulin Pen Needle 32G X 4 MM MISC; Use in the morning    Mild obstructive sleep apnea  -     liraglutide (SAXENDA) injection; Inject 0 6 mg subcutaneously once per day for the first week  Increase in weekly intervals by 0 6 mg until a dose of 3 mg is reached  -     Insulin Pen Needle 32G X 4 MM MISC; Use in the morning    POTS (postural orthostatic tachycardia syndrome)  -     liraglutide (SAXENDA) injection; Inject 0 6 mg subcutaneously once per day for the first week  Increase in weekly intervals by 0 6 mg until a dose of 3 mg is reached  -     Insulin Pen Needle 32G X 4 MM MISC; Use in the morning    Abnormal craving  -     liraglutide (SAXENDA) injection; Inject 0 6 mg subcutaneously once per day for the first week  Increase in weekly intervals by 0 6 mg until a dose of 3 mg is reached  -     Insulin Pen Needle 32G X 4 MM MISC; Use in the morning            Follow up in approximately 2-3 months with Non-Surgical Physician/Advanced Practitioner  Subjective:   Chief Complaint   Patient presents with   • Follow-up     MWM  2 month F/U  waist 36 6 in       Patient ID: Cate Dejesus  is a 32 y o  female with excess weight/obesity here to pursue weight management  Patient is pursuing Conservative Program    Most recent notes and records were reviewed  HPI    Wt Readings from Last 10 Encounters:   04/07/23 83 4 kg (183 lb 12 8 oz)   02/03/23 85 1 kg (187 lb 9 6 oz)   12/16/22 83 5 kg (184 lb)   09/19/22 84 8 kg (187 lb)   08/30/22 84 9 kg (187 lb 1 6 oz)   06/27/19 68 kg (150 lb)   04/18/17 58 1 kg (128 lb)   01/05/17 57 6 kg (127 lb)     Follows with my colleague Mario Rivas PA-C  Last saw her in follow-up in February 2023  Katherine Holy was ordered, but denied by primary insurance  She would like to try to get it approved through her secondary insurance and presents to the office today to review in detail  Was food logging, but stopped due to food anxiety  Has been weighing and measuring food  Saw dietician and has been eating similarly  Struggling with portion size and cravings for sweets  She would like to start weight loss medication to assist with that  Needs to be mindful of how much she eats due to POTS  Following with cardiology for POTS  Working on diet and exercise for the past 2 years, but struggling to lose weight  B- 1 cup honey nut cheerios and protein powder with skim or 1% milk or protein bar (190 calories)  S- none typically or nuts  L- sandwich with 2 slices of low osei bread and turkey and salami and mustard and veggies or fruit and sun chips portioned  S- none   D- protein and veggies and limited amount of starch  S- Paragon Kisses 3-4 or cookies 2-3    Hydration- 3 L water, iced tea occ, hot chocolate 60 osei 1-2 times per week   Alcohol- denies  Tobacco- none  Exercise- 3-4 days per week high intensity horseback riding 1 hour, biking 2 days per week 30 minutes   Occupation-  at [employer]- 10 hours  Mild JHONATHAN, weight loss recommended rather than CPAP  Colonoscopy: N/A  Mammogram: N/A      The following portions of the patient's history were reviewed and updated as appropriate: allergies, current medications, past family history, past medical history, past social history, past surgical history, and problem list     Family History   Problem Relation Age of Onset   • Hypothyroidism Father    • Heart failure Paternal Grandfather    • Cancer Paternal Grandfather         pancreatic   • Ovarian cancer Family    • Breast cancer Family         Review of Systems   HENT: Negative for sore throat  Respiratory: Negative for cough and shortness of breath  Cardiovascular: Negative for chest pain and palpitations  Gastrointestinal: Negative for abdominal pain, constipation, diarrhea, nausea and vomiting  Denies GERD   Musculoskeletal: Negative for arthralgias and back pain  Skin: Negative for rash  Psychiatric/Behavioral: Negative for suicidal ideas (or HI)          Denies "depression  + anxiety controlled with medication       Objective:  /78   Pulse 77   Resp 16   Ht 5' 4 2\" (1 631 m)   Wt 83 4 kg (183 lb 12 8 oz)   BMI 31 35 kg/m²     Physical Exam  Vitals and nursing note reviewed  Constitutional   General appearance: Abnormal   well developed and obese  Eyes No conjunctival injection  Ears, Nose, Mouth, and Throat Oral mucosa moist    Pulmonary   Respiratory effort: No increased work of breathing or signs of respiratory distress  Cardiovascular     Examination of extremities for edema and/or varicosities: Normal   no edema  Abdomen   Abdomen: Abnormal   The abdomen was obese      Musculoskeletal   Normal range of motion  Neurological   Gait and station: Normal     Psychiatric   Orientation to person, place and time: Normal     Affect: appropriate        "

## 2023-04-07 NOTE — PATIENT INSTRUCTIONS
Common side effects of Saxenda may include: increased heart rate (pulse), headache, low blood sugar, GI/abdominal upset, heartburn, fatigue, and dizziness      VISIT SAXENDA  COM  INJECT SAXENDA DAILY SUBCUTANEOUSLY  -WEEK 1: 0 6mg daily  -if tolerated WEEK 2: 1 2mg daily  -if tolerated WEEK 3: 1 8mg daily  -if tolerated WEEK 4: 2 4mg daily  -if tolerated WEEK 5: 3mg daily  -IF NAUSEA/VOMITING DEVELOP STOP MEDICATION FOR A FEW DAYS AND DECREASE TO PREVIOUSLY TOLERATED DOSE  STAY HYDRATED  -IF YOU DEVELOP SEVERE ABDOMINAL PAIN WHICH MAY RADIATE TO THE BACK, SOMETIMES ASSOCIATED WITH FEVER, AND VOMITING, STOP MEDICATION AND SEEK URGENT CARE AS THIS MAY BE A SIGN OF PANCREATITIS  Please make sure that you are cleaning the area with alcohol wipes before each use, changing the needles before each use, and rotating the injection site  Please inject at a 90 degree angle  You can also have someone inject the [address] for you in the back of the upper arm  Please watch this link on how to use the pen        [email or web address]

## 2023-04-09 NOTE — ASSESSMENT & PLAN NOTE
- Patient is pursuing conservative plan   Did body stats package prior   - Screening labs done 9/2022    Initial:187 1  Last visit:187 6  Current: 183 8 lbs BMI 31 35  Change: -3 3 lb (-3 8 lbs from last OV)  Goal: wanted to improve strength       Goals;  Continue tracking meals-not calorie logging as it increases her anxiety  1200 calories would be the goal  -Keep up the great work with getting protein with each meal   - Keep up the great work with water intake  - Keep up the great work with exercise  - Has been following diet and exercise regimen, but is struggling with appetite and cravings and would like to start Saxenda to help with that  Reviewed need to not skip meals given history of POTS  She will monitor her heart rate while on Saxenda  - Patient denies personal history of pancreatitis  Patient also denies personal and family history of thyroid cancer and multiple endocrine neoplasia type 2 (MEN 2 tumor)  - Side effects of Saxenda discussed: nausea, vomiting, diarrhea, and constipation  If severe abdominal pain develops, stop Saxenda and go to the ER, as this could be pancreatitis  Monitor heart rate while on Saxenda and if resting heart rate greater than 100 beats per minute, patient will notify me  - Not currently sexually active, but discussed that if she would become sexually active, some form of birth control recommended while on weight loss medications